# Patient Record
Sex: FEMALE | Race: WHITE
[De-identification: names, ages, dates, MRNs, and addresses within clinical notes are randomized per-mention and may not be internally consistent; named-entity substitution may affect disease eponyms.]

---

## 2018-07-03 ENCOUNTER — HOSPITAL ENCOUNTER (OUTPATIENT)
Dept: HOSPITAL 61 - NM | Age: 63
Discharge: HOME | End: 2018-07-03
Attending: INTERNAL MEDICINE
Payer: COMMERCIAL

## 2018-07-03 DIAGNOSIS — J44.9: ICD-10-CM

## 2018-07-03 DIAGNOSIS — E78.00: ICD-10-CM

## 2018-07-03 DIAGNOSIS — I10: ICD-10-CM

## 2018-07-03 DIAGNOSIS — I36.1: Primary | ICD-10-CM

## 2018-07-03 DIAGNOSIS — I27.20: ICD-10-CM

## 2018-07-03 DIAGNOSIS — E78.5: ICD-10-CM

## 2018-07-03 PROCEDURE — 96374 THER/PROPH/DIAG INJ IV PUSH: CPT

## 2018-07-03 PROCEDURE — 96376 TX/PRO/DX INJ SAME DRUG ADON: CPT

## 2018-07-03 PROCEDURE — 93017 CV STRESS TEST TRACING ONLY: CPT

## 2018-07-03 PROCEDURE — 96375 TX/PRO/DX INJ NEW DRUG ADDON: CPT

## 2018-07-03 PROCEDURE — 93306 TTE W/DOPPLER COMPLETE: CPT

## 2018-07-03 PROCEDURE — 78452 HT MUSCLE IMAGE SPECT MULT: CPT

## 2018-07-03 RX ADMIN — REGADENOSON 1 MG: 0.08 INJECTION, SOLUTION INTRAVENOUS at 08:15

## 2018-11-28 ENCOUNTER — HOSPITAL ENCOUNTER (OUTPATIENT)
Dept: HOSPITAL 61 - SURG | Age: 63
Discharge: HOME | End: 2018-11-28
Attending: ORTHOPAEDIC SURGERY
Payer: COMMERCIAL

## 2018-11-28 VITALS — SYSTOLIC BLOOD PRESSURE: 116 MMHG | DIASTOLIC BLOOD PRESSURE: 68 MMHG

## 2018-11-28 VITALS — WEIGHT: 158 LBS | BODY MASS INDEX: 25.39 KG/M2 | HEIGHT: 66 IN

## 2018-11-28 DIAGNOSIS — Z98.890: ICD-10-CM

## 2018-11-28 DIAGNOSIS — Z88.1: ICD-10-CM

## 2018-11-28 DIAGNOSIS — Z91.013: ICD-10-CM

## 2018-11-28 DIAGNOSIS — Z91.041: ICD-10-CM

## 2018-11-28 DIAGNOSIS — Z88.8: ICD-10-CM

## 2018-11-28 DIAGNOSIS — Z88.5: ICD-10-CM

## 2018-11-28 DIAGNOSIS — Z88.6: ICD-10-CM

## 2018-11-28 DIAGNOSIS — M65.311: ICD-10-CM

## 2018-11-28 DIAGNOSIS — G56.01: Primary | ICD-10-CM

## 2018-11-28 PROCEDURE — 26055 INCISE FINGER TENDON SHEATH: CPT

## 2018-11-28 PROCEDURE — 64721 CARPAL TUNNEL SURGERY: CPT

## 2018-11-28 PROCEDURE — 82962 GLUCOSE BLOOD TEST: CPT

## 2018-11-28 NOTE — DISCH
DISCHARGE INSTRUCTIONS


Condition on Discharge


Condition on Discharge:  Stable





Activity After Discharge


Activity Instructions for Disc:  Other, see below


Other activity instructions:  wiggle fingers


Bathing Instructions:  Shower-keep dressing dry


Lifting Instructions after Dis:  No heavy lifting


Weight Bearing Status after Di:  As tolerated





Diet after Discharge


Diet after Discharge:  Regular





Wound Incision Care


Wound/Incision Care:  Ice to area for comfort, Keep wound/cast CDI, Keep wound 

elevated, Change dressing


Other wound/incision instructi:  ok to change dressing in 2 days





Contacting the DR. after DC


Call your doctor for:  Concerns you may have





Follow-Up


Follow up with:  Kay in 2 wks











ANUSHKA BELL II, MD Nov 28, 2018 07:07

## 2018-11-28 NOTE — PDOC4
Operative Note


Operative Note


Date of procedure: 11/20/2018





Surgeon: Baljit Bell





Preoperative diagnosis: #1 right carpal tunnel syndrome


#2 right trigger finger, first digit





Postoperative diagnosis: Same





Procedures performed: #1 open right carpal tunnel release


Open right first digit trigger finger release





Anesthesia: Tiff block with sedation





Tourniquet time: 30 minutes





Blood loss: 5 mL





Complications: None





Findings: Normal-appearing median nerve, thickened nodule in the flexor tendon 

at thumb





Reason for procedure: Patient is very pleasant 63-year-old female who have been 

following for her carpal tunnel syndrome and trigger thumb of her right hand. 

She had tried conservative therapies including anti-inflammatories, nighttime 

splinting, and carpal tunnel injections. These failed to provide her adequate 

relief from her symptoms and therefore we discussed the risks, benefits, and 

alternatives to the above surgery and she wished to proceed.





Description of procedure: Patient was greeted in the preoperative area by 

myself for the correct extremity was verified and marked. She was brought back 

to the operative suite and box were started as she was brought back. Once in 

the operating room, she had successful induction of a [place] block with sedation. 

The right upper extremity was then prepped and draped in our usual sterile 

fashion we conducted our standard preoperative timeout. After this, I 

identified a crease in her hand from her distal wrist crease and incised 

through this into her palm. I used bipolar cautery for hemostasis. I used a 

mosquito to spread down to the level of the palmar fascia and placed a self-

retaining retractor. The palmar fascia was incised in line with the skin 

incision. I repositioned my retractor. Identified the transverse carpal 

ligament and release this sharply with a scalpel. I placed a Ragnell retractor 

distally and into my release with the tenotomy into the palm. I then performed 

the same maneuver to release the distal antebrachial fascia and an ulnar 

directed fashion into the wrist. I used the tip of the tenotomies palpate along 

the course of the nerve to ensure that accomplished a complete release and I 

was confident I had. I then directed my attention to her thumb and palpated for 

the nodule and made an incision, proximal 1 cm over the neck of the first 

metacarpal. Bipolar cautery was used for hemostasis. I used a mosquito clamp to 

spread above the tendon longitudinally. Identified the A1 pulley and release 

this with tenotomy scissors. I then delivered the tendon from the operative 

field with a mosquito to help and try to accomplish a complete release. After 

this, irrigated both incisions out. I then closed the incisions with simple 

interrupted 3-0 nylon. I then injected a local anesthetic mixture noted to the 

lisbeth-incisional skin at both areas. Xeroform followed by a soft bulky dressing 

was applied to her hand and thumb. No complications. All counts correct 2 

prior to wound closure. At the inclusion of surgery, she was awakened from her 

anesthetic and the tourniquet was let down. She was transferred gently supine 

to the recovery room cart. Postoperative plan is to discharge her home. Active 

range of motion at wrist and fingers was encouraged. Wound care was discussed 

and given written form. She will follow up with me in 2 weeks, sooner should a 

problem arise.











BALJIT BELL II, MD Nov 28, 2018 08:11

## 2020-03-11 ENCOUNTER — HOSPITAL ENCOUNTER (INPATIENT)
Dept: HOSPITAL 61 - ER | Age: 65
LOS: 2 days | Discharge: HOME | DRG: 554 | End: 2020-03-13
Attending: FAMILY MEDICINE | Admitting: FAMILY MEDICINE
Payer: COMMERCIAL

## 2020-03-11 VITALS — DIASTOLIC BLOOD PRESSURE: 58 MMHG | SYSTOLIC BLOOD PRESSURE: 109 MMHG

## 2020-03-11 VITALS — BODY MASS INDEX: 26.01 KG/M2 | WEIGHT: 161.82 LBS | HEIGHT: 66 IN

## 2020-03-11 DIAGNOSIS — M17.12: Primary | ICD-10-CM

## 2020-03-11 DIAGNOSIS — Z79.01: ICD-10-CM

## 2020-03-11 DIAGNOSIS — Z88.8: ICD-10-CM

## 2020-03-11 DIAGNOSIS — G89.29: ICD-10-CM

## 2020-03-11 DIAGNOSIS — F40.240: ICD-10-CM

## 2020-03-11 DIAGNOSIS — Z96.651: ICD-10-CM

## 2020-03-11 DIAGNOSIS — Z88.2: ICD-10-CM

## 2020-03-11 DIAGNOSIS — R07.89: ICD-10-CM

## 2020-03-11 DIAGNOSIS — F41.9: ICD-10-CM

## 2020-03-11 DIAGNOSIS — Z87.440: ICD-10-CM

## 2020-03-11 DIAGNOSIS — E78.5: ICD-10-CM

## 2020-03-11 DIAGNOSIS — I10: ICD-10-CM

## 2020-03-11 DIAGNOSIS — Z86.711: ICD-10-CM

## 2020-03-11 DIAGNOSIS — Z82.49: ICD-10-CM

## 2020-03-11 DIAGNOSIS — Z91.013: ICD-10-CM

## 2020-03-11 DIAGNOSIS — J45.909: ICD-10-CM

## 2020-03-11 DIAGNOSIS — Z90.49: ICD-10-CM

## 2020-03-11 DIAGNOSIS — E11.9: ICD-10-CM

## 2020-03-11 DIAGNOSIS — E78.00: ICD-10-CM

## 2020-03-11 DIAGNOSIS — Z79.899: ICD-10-CM

## 2020-03-11 DIAGNOSIS — Z86.718: ICD-10-CM

## 2020-03-11 LAB
ALBUMIN SERPL-MCNC: 4.2 G/DL (ref 3.4–5)
ALBUMIN/GLOB SERPL: 1.2 {RATIO} (ref 1–1.7)
ALP SERPL-CCNC: 82 U/L (ref 46–116)
ALT SERPL-CCNC: 23 U/L (ref 14–59)
ANION GAP SERPL CALC-SCNC: 9 MMOL/L (ref 6–14)
AST SERPL-CCNC: 17 U/L (ref 15–37)
BASOPHILS # BLD AUTO: 0.1 X10^3/UL (ref 0–0.2)
BASOPHILS NFR BLD: 1 % (ref 0–3)
BILIRUB SERPL-MCNC: 0.3 MG/DL (ref 0.2–1)
BUN SERPL-MCNC: 18 MG/DL (ref 7–20)
BUN/CREAT SERPL: 18 (ref 6–20)
CALCIUM SERPL-MCNC: 9.5 MG/DL (ref 8.5–10.1)
CHLORIDE SERPL-SCNC: 102 MMOL/L (ref 98–107)
CO2 SERPL-SCNC: 30 MMOL/L (ref 21–32)
CREAT SERPL-MCNC: 1 MG/DL (ref 0.6–1)
D DIMER PPP FEU-MCNC: 0.6 UG/MLFEU (ref 0–0.5)
EOSINOPHIL NFR BLD: 0.2 X10^3/UL (ref 0–0.7)
EOSINOPHIL NFR BLD: 3 % (ref 0–3)
ERYTHROCYTE [DISTWIDTH] IN BLOOD BY AUTOMATED COUNT: 12.9 % (ref 11.5–14.5)
GFR SERPLBLD BASED ON 1.73 SQ M-ARVRAT: 56 ML/MIN
GLOBULIN SER-MCNC: 3.4 G/DL (ref 2.2–3.8)
GLUCOSE SERPL-MCNC: 97 MG/DL (ref 70–99)
HCT VFR BLD CALC: 39.2 % (ref 36–47)
HGB BLD-MCNC: 13.3 G/DL (ref 12–15.5)
LIPASE: 225 U/L (ref 73–393)
LYMPHOCYTES # BLD: 2.2 X10^3/UL (ref 1–4.8)
LYMPHOCYTES NFR BLD AUTO: 37 % (ref 24–48)
MAGNESIUM SERPL-MCNC: 1.8 MG/DL (ref 1.8–2.4)
MCH RBC QN AUTO: 31 PG (ref 25–35)
MCHC RBC AUTO-ENTMCNC: 34 G/DL (ref 31–37)
MCV RBC AUTO: 91 FL (ref 79–100)
MONO #: 0.4 X10^3/UL (ref 0–1.1)
MONOCYTES NFR BLD: 7 % (ref 0–9)
NEUT #: 3.1 X10^3/UL (ref 1.8–7.7)
NEUTROPHILS NFR BLD AUTO: 52 % (ref 31–73)
PLATELET # BLD AUTO: 281 X10^3/UL (ref 140–400)
POTASSIUM SERPL-SCNC: 3.6 MMOL/L (ref 3.5–5.1)
PROT SERPL-MCNC: 7.6 G/DL (ref 6.4–8.2)
PROTHROMBIN TIME: 12.4 SEC (ref 11.7–14)
RBC # BLD AUTO: 4.3 X10^6/UL (ref 3.5–5.4)
SODIUM SERPL-SCNC: 141 MMOL/L (ref 136–145)
WBC # BLD AUTO: 5.9 X10^3/UL (ref 4–11)

## 2020-03-11 PROCEDURE — 83880 ASSAY OF NATRIURETIC PEPTIDE: CPT

## 2020-03-11 PROCEDURE — 80053 COMPREHEN METABOLIC PANEL: CPT

## 2020-03-11 PROCEDURE — A9540 TC99M MAA: HCPCS

## 2020-03-11 PROCEDURE — 80061 LIPID PANEL: CPT

## 2020-03-11 PROCEDURE — 93005 ELECTROCARDIOGRAM TRACING: CPT

## 2020-03-11 PROCEDURE — 83690 ASSAY OF LIPASE: CPT

## 2020-03-11 PROCEDURE — 71045 X-RAY EXAM CHEST 1 VIEW: CPT

## 2020-03-11 PROCEDURE — 94760 N-INVAS EAR/PLS OXIMETRY 1: CPT

## 2020-03-11 PROCEDURE — 94640 AIRWAY INHALATION TREATMENT: CPT

## 2020-03-11 PROCEDURE — 83735 ASSAY OF MAGNESIUM: CPT

## 2020-03-11 PROCEDURE — 85379 FIBRIN DEGRADATION QUANT: CPT

## 2020-03-11 PROCEDURE — 78580 LUNG PERFUSION IMAGING: CPT

## 2020-03-11 PROCEDURE — 85025 COMPLETE CBC W/AUTO DIFF WBC: CPT

## 2020-03-11 PROCEDURE — 82962 GLUCOSE BLOOD TEST: CPT

## 2020-03-11 PROCEDURE — 36415 COLL VENOUS BLD VENIPUNCTURE: CPT

## 2020-03-11 PROCEDURE — 85610 PROTHROMBIN TIME: CPT

## 2020-03-11 PROCEDURE — 84484 ASSAY OF TROPONIN QUANT: CPT

## 2020-03-11 PROCEDURE — 96374 THER/PROPH/DIAG INJ IV PUSH: CPT

## 2020-03-11 PROCEDURE — G0379 DIRECT REFER HOSPITAL OBSERV: HCPCS

## 2020-03-11 PROCEDURE — G0378 HOSPITAL OBSERVATION PER HR: HCPCS

## 2020-03-11 RX ADMIN — MONTELUKAST SODIUM SCH MG: 10 TABLET, FILM COATED ORAL at 23:44

## 2020-03-11 RX ADMIN — BACITRACIN SCH MLS/HR: 5000 INJECTION, POWDER, FOR SOLUTION INTRAMUSCULAR at 19:12

## 2020-03-11 RX ADMIN — ONDANSETRON PRN MG: 2 INJECTION INTRAMUSCULAR; INTRAVENOUS at 19:11

## 2020-03-11 RX ADMIN — ATORVASTATIN CALCIUM SCH MG: 20 TABLET, FILM COATED ORAL at 23:42

## 2020-03-11 RX ADMIN — PANTOPRAZOLE SODIUM SCH MG: 40 TABLET, DELAYED RELEASE ORAL at 23:42

## 2020-03-11 RX ADMIN — AMITRIPTYLINE HYDROCHLORIDE SCH MG: 10 TABLET, FILM COATED ORAL at 23:42

## 2020-03-11 RX ADMIN — CETIRIZINE HYDROCHLORIDE SCH MG: 10 TABLET, FILM COATED ORAL at 23:42

## 2020-03-11 RX ADMIN — GABAPENTIN SCH MG: 100 CAPSULE ORAL at 23:42

## 2020-03-11 RX ADMIN — ZOLPIDEM TARTRATE SCH MG: 5 TABLET ORAL at 23:42

## 2020-03-11 NOTE — PHYS DOC
Past Medical History


Past Medical History:  Asthma, Bronchitis, High Cholesterol, Hypertension, Other


Additional Past Medical Histor:  PE, DVT, bulging discs


Past Surgical History:  Knee Replacement, Tonsillectomy, Other


Additional Past Surgical Histo:  IVC filter


Smoking Status:  Never Smoker


Alcohol Use:  None


Drug Use:  None





Adult General


Chief Complaint


Chief Complaint:  CHEST PAIN





HPI


HPI





Patient is a 64  year old [female who presents with chest pressure.  Patient 

reports for the last 12 hours.  States that she started to feel a bit of 

pressure last night, today and the pressures continued.  States she has a 

history of prior DVT, prior pulmonary embolism, had an IVC filter placed, which 

had since dislodged and ended up in her lungs.  Reports she has chronic chest 

pain due to the IVC filter in her lungs, however the discomfort she feels today 

is different.  Patient denies any nausea.  Does states she has noticed some 

swelling in her feet, with some increased swelling mostly behind her knees.  

Denies cough.  Does states she also has had some increased exertional dyspnea 

over the last few days.  Reports her discomfort today is just a pressure in her 

chest





Review of Systems


Review of Systems





Constitutional: Denies fever or chills []


Eyes: Denies change in visual acuity, redness, or eye pain []


HENT: Denies nasal congestion or sore throat []


Respiratory: Denies cough does report she feels short of breath


Cardiovascular: No additional information not addressed in HPI []


GI: Denies abdominal pain, nausea, vomiting, bloody stools or diarrhea []


: Denies dysuria or hematuria []


Musculoskeletal: Denies back pain or joint pain reports she feels her lower 

extremities are more swollen []


Integument: Denies rash or skin lesions []


Neurologic: Denies headache, focal weakness or sensory changes []


Endocrine: Denies polyuria or polydipsia []





All other systems were reviewed and found to be within normal limits, except as 

documented in this note.





Current Medications


Current Medications





Current Medications








 Medications


  (Trade)  Dose


 Ordered  Sig/Hilda  Start Time


 Stop Time Status Last Admin


Dose Admin


 


 Enoxaparin Sodium


  (Lovenox 80mg


 Syringe)  70 mg  1X  ONCE  3/11/20 18:30


 3/11/20 18:38 DC 3/11/20 19:11


70 MG


 


 Ondansetron HCl


  (Zofran)  4 mg  PRN Q8HRS  PRN  3/11/20 18:30


 3/12/20 18:29  3/11/20 19:11


4 MG


 


 Sodium Chloride  1,000 ml @ 


 100 mls/hr  Q10H  3/11/20 18:30


 3/12/20 18:29  3/11/20 19:12


100 MLS/HR











Allergies


Allergies





Allergies








Coded Allergies Type Severity Reaction Last Updated Verified


 


  Iodinated Contrast Media Allergy Intermediate  (allergy to shellfish) 11/28/18

 Yes


 


  amoxicillin Allergy Intermediate  11/28/18 Yes


 


  azithromycin Allergy Intermediate  11/28/18 Yes


 


  duloxetine Allergy Intermediate  11/28/18 Yes


 


  formoterol Allergy Intermediate  11/28/18 Yes


 


  levofloxacin Allergy Intermediate  11/28/18 Yes


 


  losartan Allergy Intermediate  11/28/18 Yes


 


  mometasone furoate Allergy Intermediate  11/28/18 Yes


 


  nickel Allergy Intermediate  11/28/18 Yes


 


  pregabalin Allergy Intermediate  11/28/18 Yes


 


  shellfish derived Allergy Intermediate  11/28/18 Yes


 


  morphine Adverse Reaction Intermediate nausea and vomiting 11/28/18 Yes











Physical Exam


Physical Exam





Constitutional: Well developed, well nourished, no acute distress, non-toxic 

appearance. []


HENT: Normocephalic, atraumatic, oropharynx moist, no oral exudates, nose 

normal. []


Eyes: PERRLA, EOMI, conjunctiva normal, no discharge. [] 


Neck: Normal range of motion, no tenderness, supple, no stridor. [] 


Cardiovascular:Heart rate regular rhythm, no murmur []


Lungs & Thorax:  Bilateral breath sounds clear to auscultation speaks in 

multiple word sentences, however does appear to have mild dyspnea, no wheezing 

noted []


Abdomen: Bowel sounds normal, soft, no tenderness, no masses, no pulsatile 

masses. [] 


Skin: Warm, dry, no erythema, no rash. [] 


Back: No tenderness, no CVA tenderness. [] 


Extremities: No tenderness, no cyanosis, no clubbing, ROM intact, no edema.  No 

swelling noted.  No lesions noted to lower legs.  [] 


Neurologic: Alert and oriented X 3, normal motor function, normal sensory 

function, no focal deficits noted. []


Psychologic: Affect normal, judgement normal, mood normal. []





Current Patient Data


Vital Signs





                                   Vital Signs








  Date Time  Temp Pulse Resp B/P (MAP) Pulse Ox O2 Delivery O2 Flow Rate FiO2


 


3/11/20 18:00  70 21 135/71 (92) 97 Room Air  


 


3/11/20 16:12 98.4       





 98.4       








Lab Values





                                Laboratory Tests








Test


 3/11/20


16:05


 


White Blood Count


 5.9 x10^3/uL


(4.0-11.0)


 


Red Blood Count


 4.30 x10^6/uL


(3.50-5.40)


 


Hemoglobin


 13.3 g/dL


(12.0-15.5)


 


Hematocrit


 39.2 %


(36.0-47.0)


 


Mean Corpuscular Volume


 91 fL ()





 


Mean Corpuscular Hemoglobin 31 pg (25-35)  


 


Mean Corpuscular Hemoglobin


Concent 34 g/dL


(31-37)


 


Red Cell Distribution Width


 12.9 %


(11.5-14.5)


 


Platelet Count


 281 x10^3/uL


(140-400)


 


Neutrophils (%) (Auto) 52 % (31-73)  


 


Lymphocytes (%) (Auto) 37 % (24-48)  


 


Monocytes (%) (Auto) 7 % (0-9)  


 


Eosinophils (%) (Auto) 3 % (0-3)  


 


Basophils (%) (Auto) 1 % (0-3)  


 


Neutrophils # (Auto)


 3.1 x10^3/uL


(1.8-7.7)


 


Lymphocytes # (Auto)


 2.2 x10^3/uL


(1.0-4.8)


 


Monocytes # (Auto)


 0.4 x10^3/uL


(0.0-1.1)


 


Eosinophils # (Auto)


 0.2 x10^3/uL


(0.0-0.7)


 


Basophils # (Auto)


 0.1 x10^3/uL


(0.0-0.2)


 


Prothrombin Time


 12.4 SEC


(11.7-14.0)


 


Prothrombin Time INR 1.0 (0.8-1.1)  


 


D-Dimer (Kemi)


 0.60 ug/mlFEU


(0.00-0.50)  H


 


Sodium Level


 141 mmol/L


(136-145)


 


Potassium Level


 3.6 mmol/L


(3.5-5.1)


 


Chloride Level


 102 mmol/L


()


 


Carbon Dioxide Level


 30 mmol/L


(21-32)


 


Anion Gap 9 (6-14)  


 


Blood Urea Nitrogen


 18 mg/dL


(7-20)


 


Creatinine


 1.0 mg/dL


(0.6-1.0)


 


Estimated GFR


(Cockcroft-Gault) 56.0  





 


BUN/Creatinine Ratio 18 (6-20)  


 


Glucose Level


 97 mg/dL


(70-99)


 


Calcium Level


 9.5 mg/dL


(8.5-10.1)


 


Magnesium Level


 1.8 mg/dL


(1.8-2.4)


 


Total Bilirubin


 0.3 mg/dL


(0.2-1.0)


 


Aspartate Amino Transferase


(AST) 17 U/L (15-37)





 


Alanine Aminotransferase (ALT)


 23 U/L (14-59)





 


Alkaline Phosphatase


 82 U/L


()


 


Troponin I Quantitative


 < 0.017 ng/mL


(0.000-0.055)


 


NT-Pro-B-Type Natriuretic


Peptide 104 pg/mL


(0-124)


 


Total Protein


 7.6 g/dL


(6.4-8.2)


 


Albumin


 4.2 g/dL


(3.4-5.0)


 


Albumin/Globulin Ratio 1.2 (1.0-1.7)  


 


Lipase


 225 U/L


()





                                Laboratory Tests


3/11/20 16:05








                                Laboratory Tests


3/11/20 16:05











EKG


EKG


Normal sinus rhythm, no STEMI per Dr. Lovett at 1605 []





Radiology/Procedures


Radiology/Procedures


No radiographic evidence for acute cardiopulmonary process. 


Per Dr Erickson, Radiology[]





Course & Med Decision Making


Course & Med Decision Making


Pertinent Labs and Imaging studies reviewed. (See chart for details)





[Discussed findings with patient, with history of prior DVT and pulmonary 

embolism, with shortness of breath, chest discomfort, elevated d-dimer, and need

 to further evaluate for pulmonary embolism.  Discussed patient allergy, reports

 iodine makes her throat close up, rash, shortness of breath.  Patient does 

report she has had a VQ scan in the past.  Patient agreeable to admission for VQ

 scan if needed, will discuss with Dr. Vidales 





PERC - 2





] Discussed with Dr. Vidales, agrees to admission.  Plan to administer 

anticoagulant here.  With plan for VQ scan tomorrow.  Will monitor labs tonight.

 Patient agreement with plan with no further questions or concerns.





Dragon Disclaimer


Dragon Disclaimer


This electronic medical record was generated, in whole or in part, using a voice

recognition dictation system.





Departure


Departure


Impression:  


   Primary Impression:  


   Elevated d-dimer


   Additional Impression:  


   Shortness of breath


Disposition:  09 ADMITTED AS INPATIENT


Admitting Physician:  Sean Vidales


Condition:  STABLE


Referrals:  


SEAN VIDALES MD (PCP)





Problem Qualifiers











FATOUMATA NIXON              Mar 11, 2020 16:23

## 2020-03-11 NOTE — EKG
[facility]

              [address] [place] [address]

Test Date:    2020               Test Time:    16:02:44

Pat Name:     SERA QUICK              Department:   

Patient ID:   [id number]           Room:          

Gender:       F                        Technician:   

:          [id number]               Requested By: FATOUMATA NIXON

Order Number: [id number]           Reading MD:     

                                 Measurements

Intervals                              Axis          

Rate:         80                       P:            47

LA:           164                      QRS:          28

QRSD:         76                       T:            46

QT:           348                                    

QTc:          405                                    

                           Interpretive Statements

SINUS RHYTHM

NORMAL ECG

RI6.01

No previous ECG available for comparison

## 2020-03-11 NOTE — RAD
EXAM: AP View of the chest

 

DATE: 3/11/2020 5:31 PM

 

INDICATION: Shortness of air

 

COMPARISON: 10/27/2016

 

FINDINGS:

 

 

The heart is not enlarged.

 

Mediastinal and hilar contours are normal.

 

No focal parenchymal airspace opacity.

 

No pleural effusion or pneumothorax.

 

Shoulder joint degenerative changes are seen.

 

IMPRESSION:

1.  No radiographic evidence for acute cardiopulmonary process.

 

Electronically signed by: Arnaud Erickson MD (3/11/2020 6:09 PM) [id number]

## 2020-03-11 NOTE — NUR
The patient, SERA QUICK, 63 y/o, F admitted by SEAN ARIAS MD, was given written 
information regarding hospital policies, unit procedures and contact persons.  patient 
transferred to room via wheelchair by ED staff member.



Valuables were checked and noted. Patient denies any needs at this time. patient is laying 
in bed watching TV at this time. This RN will continue to monitor the patient at this time.

## 2020-03-12 VITALS — SYSTOLIC BLOOD PRESSURE: 124 MMHG | DIASTOLIC BLOOD PRESSURE: 69 MMHG

## 2020-03-12 VITALS — SYSTOLIC BLOOD PRESSURE: 99 MMHG | DIASTOLIC BLOOD PRESSURE: 66 MMHG

## 2020-03-12 VITALS — DIASTOLIC BLOOD PRESSURE: 50 MMHG | SYSTOLIC BLOOD PRESSURE: 87 MMHG

## 2020-03-12 VITALS — SYSTOLIC BLOOD PRESSURE: 98 MMHG | DIASTOLIC BLOOD PRESSURE: 62 MMHG

## 2020-03-12 VITALS — SYSTOLIC BLOOD PRESSURE: 105 MMHG | DIASTOLIC BLOOD PRESSURE: 61 MMHG

## 2020-03-12 VITALS — SYSTOLIC BLOOD PRESSURE: 107 MMHG | DIASTOLIC BLOOD PRESSURE: 66 MMHG

## 2020-03-12 PROCEDURE — 3E0U3BZ INTRODUCTION OF ANESTHETIC AGENT INTO JOINTS, PERCUTANEOUS APPROACH: ICD-10-PCS | Performed by: FAMILY MEDICINE

## 2020-03-12 PROCEDURE — 3E0U33Z INTRODUCTION OF ANTI-INFLAMMATORY INTO JOINTS, PERCUTANEOUS APPROACH: ICD-10-PCS | Performed by: FAMILY MEDICINE

## 2020-03-12 RX ADMIN — GABAPENTIN SCH MG: 100 CAPSULE ORAL at 08:54

## 2020-03-12 RX ADMIN — ATORVASTATIN CALCIUM SCH MG: 20 TABLET, FILM COATED ORAL at 21:09

## 2020-03-12 RX ADMIN — BACITRACIN SCH MLS/HR: 5000 INJECTION, POWDER, FOR SOLUTION INTRAMUSCULAR at 13:33

## 2020-03-12 RX ADMIN — BACITRACIN SCH MLS/HR: 5000 INJECTION, POWDER, FOR SOLUTION INTRAMUSCULAR at 03:34

## 2020-03-12 RX ADMIN — ONDANSETRON PRN MG: 2 INJECTION INTRAMUSCULAR; INTRAVENOUS at 03:37

## 2020-03-12 RX ADMIN — PANTOPRAZOLE SODIUM SCH MG: 40 TABLET, DELAYED RELEASE ORAL at 07:59

## 2020-03-12 RX ADMIN — ZOLPIDEM TARTRATE SCH MG: 5 TABLET ORAL at 21:09

## 2020-03-12 RX ADMIN — MONTELUKAST SODIUM SCH MG: 10 TABLET, FILM COATED ORAL at 17:10

## 2020-03-12 RX ADMIN — GABAPENTIN SCH MG: 100 CAPSULE ORAL at 13:33

## 2020-03-12 RX ADMIN — GABAPENTIN SCH MG: 100 CAPSULE ORAL at 21:09

## 2020-03-12 RX ADMIN — PANTOPRAZOLE SODIUM SCH MG: 40 TABLET, DELAYED RELEASE ORAL at 16:34

## 2020-03-12 RX ADMIN — ACETAMINOPHEN PRN MG: 325 TABLET, FILM COATED ORAL at 14:58

## 2020-03-12 RX ADMIN — CETIRIZINE HYDROCHLORIDE SCH MG: 10 TABLET, FILM COATED ORAL at 17:11

## 2020-03-12 RX ADMIN — AMITRIPTYLINE HYDROCHLORIDE SCH MG: 10 TABLET, FILM COATED ORAL at 21:09

## 2020-03-12 NOTE — NUR
SW following. Discussed with RN, pt from home with . Dr Villanueva has been consulted. RN 
advised no SW needs at this time, pt gets around fine. SW will continue to follow for any 
discharge needs.

## 2020-03-12 NOTE — RAD
NUCLEAR MEDICINE PERFUSION SCAN 

 

History: Chest pain, elevated d-dimer, shortness of breath x3 days. 

History of DVT and PE 9 years ago.

 

Comparison:  AP chest, prior day. 

 

Technique: Perfusion portion performed after intravenous administration of

5.3 mCi Technetium 99m MAA. Multiple projection planar images of the lungs

were obtained. 

 

Findings: 

Patient refused mask for ventilation images due to claustrophobia.

 

Perfusion images demonstrate no perfusion defects.  

 

IMPRESSION: 

Perfusion only imaging. No defects are identified to suggest pulmonary 

embolus.

 

Electronically signed by: Aidan Chin MD (3/12/2020 2:43 PM) [id number]

## 2020-03-12 NOTE — HP
ADMIT DATE:  03/11/2020



CHIEF COMPLAINT AND HISTORY OF PRESENT ILLNESS:  This 64-year-old white female

is followed in my office.  The patient presented on the day of admission with

chest pressure.  She was sent to the Emergency Room with a history of multiple

DVTs and PEs.  She has a prior IVC filter that was dislodged and winded up in

the lungs according to her.  The pressure that she is feeling on the day of

admission is different.  She has noted some bilateral lower extremity edema,

particularly in her knee area.  She has had increased and exertional dyspnea

over the last few days, had an elevated D-dimer, had IODINATED CONTRAST allergy

and was unable to do a V/Q on the day of admission, was admitted overnight for

V/Q scanning and further evaluation.



PAST MEDICAL HISTORY:  Remarkable for asthma, hyperlipidemia, hypertension, PE,

DVT.



PRIOR SURGICAL HISTORY:  Remarkable for right knee replacement, tonsillectomy,

IVC filter.



SOCIAL HISTORY:  She is a lifetime nonsmoker.  No alcohol, no drugs.



FAMILY HISTORY:  Positive for atherosclerotic heart disease.



MEDICATIONS:  Listed on the computer and have been addressed.



ALLERGIES:  SHE IS ALLERGIC TO IODINE, PENICILLIN, AZITHROMYCIN, FORMOTEROL,

DULOXETINE, LEVAQUIN, LOSARTAN, MOMETASONE, MORPHINE, NICKEL, PREGABALIN,

SHELLFISH DERIVATIVES.



REVIEW OF SYSTEMS:  As mentioned above.



PHYSICAL EXAMINATION:

GENERAL:  She is a well-developed, well-nourished white female, in no acute

distress in bed.

VITAL SIGNS:  Stable.  She is afebrile.

HEENT:  Unremarkable.

NECK:  Supple, without adenopathy or thyromegaly.

CHEST:  Clear to auscultation and percussion.

HEART:  Regular rate and rhythm without S3, S4 or murmur.

ABDOMEN:  Soft, nontender, without hepatosplenomegaly or masses.

EXTREMITIES:  Without cyanosis, clubbing or significant edema.

NEUROLOGIC:  She is intact.



Chest x-ray is within normal limits.  D-dimer is elevated.  CBC is unremarkable.

 Troponin x 3 is negative.  CMP is unremarkable.



IMPRESSION:

1.  Chest pressure/pain.

2.  Knee pain.

3.  Other problems listed above.



PLAN:  The patient has been admitted.  We will await V/Q scanning here today.  I

am going to ask Ortho to see her for the knee pain while she is here and the

patient will be monitored, managed and treated appropriately.

 



______________________________

SEAN ARIAS MD



DR:  Fléix  JOB#:  [id number] / [id number]

DD:  03/12/2020 08:43  DT:  03/12/2020 09:33

## 2020-03-13 VITALS — DIASTOLIC BLOOD PRESSURE: 61 MMHG | SYSTOLIC BLOOD PRESSURE: 95 MMHG

## 2020-03-13 VITALS — DIASTOLIC BLOOD PRESSURE: 63 MMHG | SYSTOLIC BLOOD PRESSURE: 111 MMHG

## 2020-03-13 VITALS — DIASTOLIC BLOOD PRESSURE: 81 MMHG | SYSTOLIC BLOOD PRESSURE: 137 MMHG

## 2020-03-13 VITALS
DIASTOLIC BLOOD PRESSURE: 79 MMHG | SYSTOLIC BLOOD PRESSURE: 133 MMHG | DIASTOLIC BLOOD PRESSURE: 79 MMHG | SYSTOLIC BLOOD PRESSURE: 133 MMHG

## 2020-03-13 VITALS — SYSTOLIC BLOOD PRESSURE: 107 MMHG | DIASTOLIC BLOOD PRESSURE: 69 MMHG

## 2020-03-13 LAB
CHOLEST SERPL-MCNC: 154 MG/DL (ref 0–200)
CHOLEST/HDLC SERPL: 3.4 {RATIO}
HDLC SERPL-MCNC: 45 MG/DL (ref 40–60)
LDLC: 83 MG/DL (ref 0–100)
TRIGL SERPL-MCNC: 130 MG/DL (ref 0–150)
VLDLC: 26 MG/DL (ref 0–40)

## 2020-03-13 RX ADMIN — ALBUTEROL SULFATE SCH MG: 108 AEROSOL, METERED RESPIRATORY (INHALATION) at 15:26

## 2020-03-13 RX ADMIN — PANTOPRAZOLE SODIUM SCH MG: 40 TABLET, DELAYED RELEASE ORAL at 08:38

## 2020-03-13 RX ADMIN — PANTOPRAZOLE SODIUM SCH MG: 40 TABLET, DELAYED RELEASE ORAL at 17:16

## 2020-03-13 RX ADMIN — ALBUTEROL SULFATE SCH MG: 108 AEROSOL, METERED RESPIRATORY (INHALATION) at 07:32

## 2020-03-13 RX ADMIN — ALBUTEROL SULFATE SCH MG: 108 AEROSOL, METERED RESPIRATORY (INHALATION) at 11:18

## 2020-03-13 RX ADMIN — ACETAMINOPHEN PRN MG: 325 TABLET, FILM COATED ORAL at 08:38

## 2020-03-13 RX ADMIN — GABAPENTIN SCH MG: 100 CAPSULE ORAL at 13:45

## 2020-03-13 RX ADMIN — GABAPENTIN SCH MG: 100 CAPSULE ORAL at 08:38

## 2020-03-13 RX ADMIN — ACETAMINOPHEN PRN MG: 325 TABLET, FILM COATED ORAL at 13:39

## 2020-03-13 NOTE — DS
DATE OF DISCHARGE:  03/13/2020



PRIMARY DIAGNOSIS:  Chest pain with shortness of breath.



ADDITIONAL DIAGNOSES:

1.  History of multiple pulmonary embolisms.

2.  Elevated D-dimer.

3.  Diabetes.

4.  Left knee pain.



CHIEF COMPLAINT AND HISTORY OF PRESENT ILLNESS:  This 64-year-old white female

admitted through the Emergency Room with shortness of breath, elevated D-dimer

and inability to do a CTA of the chest due to IODINE ALLERGY.



SUMMARY OF STAY:  The patient was admitted, had 2 poor V/Q scanning the

following day.  She was unable to complete this task due to claustrophobia, but

perfusion images demonstrate no perfusion deficits.  Because of the chest

pressure and shortness of breath, cardiology was asked to see her and felt it

was atypical and did not recommend any further workup unless it persisted.  She

had serial negative troponins.  A chest x-ray that showed no evidence of any

problems.  Had a CBC that was unremarkable.  Chem panel done on admission that

was essentially unremarkable.  Again, troponins x 4 were done.  Lipid panel was

within normal limits with an LDL of 83.  Sugars were good during the stay and

always within normal limits.  She complained of left knee pain involving the

shin bone for which I asked Ortho to come by and inject it with steroids,

probably osteoarthritis while she was in the hospital.  With Cardiology feeling

no further workup was needed, the patient is discharged on 03/13/2020.



DISPOSITION:  The patient is discharged to home, ADA diet.



ACTIVITY:  As tolerated.



FOLLOWUP:  Office in 1 week.



DISCHARGE MEDICATIONS:  Listed on the med rec and have been addressed.

 



______________________________

SEAN ARIAS MD



DR:  LUCIAN/kim  JOB#:  [id number] / [id number]

DD:  03/13/2020 17:24  DT:  03/13/2020 19:21

## 2020-03-13 NOTE — PDOC2
CHRISTOPHER ESPINOZA APRN 3/13/20 1146:


CARDIAC CONSULT


DATE OF CONSULT


Date of Consult


DATE: 3/13/20 


TIME: 11:40





REASON FOR CONSULT


Reason for Consult:


Chest pain





REFERRING PHYSICIAN


Referring Physician:


Appl





SOURCE


Source:  Chart review, Patient





HISTORY OF PRESENT ILLNESS


HISTORY OF PRESENT ILLNESS


This is a pleasant 65 yo female admitted for complains of chest pain. Reports 

that she has intermittent chest heaviness nonradiating. No associated BLISS.  Pt 

is able to load the laundry and carry it up and down the basement without 

difficulty.  She appears anxious and her chest pain is left of the midsternal 

border that is reproducible with palpation.  Denies any heartburn and with use 

of PPI.  Reports no NSAID therapy but takes tylenol for pain.  No recent 

intractable coughing, falls or any injury.  She is worried about pulmonary HTN 

and no noted edema and negative for orthopnea and PND.  Clinically no CHF.  

Reports no changes to activity tolerance.  She is also worried about a wire in 

her vena cava to which her IVC filter was removed before but a part of it "wire"

was left in place as this was not able to be retrieved. Her VSS and Hgb is 

stable. No abd pain nor significant back pain. Denies any palpitations or 

passing out.





PAST MEDICAL HISTORY


Cardiovascular:  HTN, Hyperlipidemia


Pulmonary:  Asthma, Pulmonary embolus (with DVT)


CENTRAL NERVOUS SYSTEM:  Carpal Tunnel Syndrome, Periperal neuropathy


Psych:  Anxiety


Musculoskeletal:   low back pain, Osteoarthritis


Renal/:  UTI


Endocrine:  Diabetes (2)





PAST SURGICAL HISTORY


Past Surgical History:  Total knee replacement (right), Other (IVC filter 

placement and removal)





SOCIAL HISTORY


Smoke:  No


ALCOHOL:  none


Lives:  with Family





CURRENT MEDICATIONS


CURRENT MEDICATIONS





Current Medications








 Medications


  (Trade)  Dose


 Ordered  Sig/Hilda


 Route


 PRN Reason  Start Time


 Stop Time Status Last Admin


Dose Admin


 


 Acetaminophen


  (Tylenol)  650 mg  PRN Q6HRS  PRN


 PO


 MILD PAIN / TEMP  3/12/20 14:30


    3/13/20 08:38





 


 Albuterol Sulfate


  (Ventolin Neb


 Soln)  2.5 mg  RTQID


 NEB


   3/13/20 08:00


    3/13/20 11:18





 


 Methylprednisolone


 Acetate


  (DEPO-Medrol


 80MG VIAL)  80 mg  1X  ONCE


 INJ


   3/13/20 07:45


 3/13/20 07:52 DC 3/13/20 08:50





 


 Bupivacaine HCl/


 Epinephrine Bitart


  (Sensorcain-Epi


 0.5%-1:175763 Mpf)  30 ml  1X  ONCE


 INJ


   3/13/20 07:45


 3/13/20 07:52 DC 3/13/20 08:50














ALLERGIES


ALLERGIES:  


Coded Allergies:  


     Iodinated Contrast Media (Verified  Allergy, Intermediate,  (allergy to 

shellfish), 11/28/18)


     amoxicillin (Verified  Allergy, Intermediate, 11/28/18)


     azithromycin (Verified  Allergy, Intermediate, 11/28/18)


     duloxetine (Verified  Allergy, Intermediate, 11/28/18)


     formoterol (Verified  Allergy, Intermediate, 11/28/18)


     levofloxacin (Verified  Allergy, Intermediate, 11/28/18)


     losartan (Verified  Allergy, Intermediate, 11/28/18)


     mometasone furoate (Verified  Allergy, Intermediate, 11/28/18)


     nickel (Verified  Allergy, Intermediate, 11/28/18)


     pregabalin (Verified  Allergy, Intermediate, 11/28/18)


     shellfish derived (Verified  Allergy, Intermediate, 11/28/18)


     morphine (Verified  Adverse Reaction, Intermediate, nausea and vomiting, 

11/28/18)





ROS


Review of System


14 point ROS evaluated with pertinent positives noted per HPI





PHYSICAL EXAM


General:  Alert, Oriented X3, Cooperative, No acute distress


HEENT:  Atraumatic, Mucous membr. moist/pink


Lungs:  Clear to auscultation, Normal air movement


Heart:  Regular rate (SR), Normal S1, Normal S2, Other (2/6 systolic murmur to 

LLS border)


Abdomen:  Soft, No tenderness


Extremities:  No cyanosis, No edema


Skin:  No breakdown, No significant lesion


Neuro:  Normal speech, Sensation intact


Psych/Mental Status:  Mood NL


MUSCULOSKELETAL:  Osteoarthritic changes both hands





VITALS/I&O


VITALS/I&O:





                                   Vital Signs








  Date Time  Temp Pulse Resp B/P (MAP) Pulse Ox O2 Delivery O2 Flow Rate FiO2


 


3/13/20 11:20      Room Air  


 


3/13/20 07:34     96   


 


3/13/20 07:00 97.8 79 16 137/81 (99)    





 97.8       














                                    I & O   


 


 3/12/20 3/12/20 3/13/20





 15:00 23:00 07:00


 


Intake Total 980 ml 120 ml 300 ml


 


Balance 980 ml 120 ml 300 ml











ECHOCARDIOGRAM


ECHOCARDIOGRAM





<Conclusion>


The left ventricular systolic function is normal.


The ejection fraction is estimated at 55-60%.


There is normal LV segmental wall motion.


Mild tricuspid regurgitation.


There is mild pulmonary hypertension.


The PA pressure was estimated at 35 mmHg.


There is no evidence of significant pericardial effusion.





DATE:     07/03/18 0916





STRESS TEST


STRESS TEST





Conclusion


1. No evidence of EKG changes with stress testing.


2. Normal perfusion at stress/rest.


3. Low risk study.


4. EF > 60%.





DATE:     07/05/18 1024





ASSESSMENT/PLAN


ASSESSMENT/PLAN


1. Atypical chest pain: trops nml. EKG SR no acute changes. suspect MSK and 

anxiety. Good functional capacity


2. HTN: controlled


3. HLP


4. DM2


5. Prior IVC filter removal with hx of PE: V/Q low probability. pt concern about

wire being left upon retrieval. Will defer to PCP


6. Anxiety





Recommendations


1. Continue secondary prevention measures  Continue home PPI


2. Could consider for outpt stress test if symptoms persist but at this time no 

further testing


3. Follow up in office RASHARD NAJERA MD 3/14/20 1348:


CARDIAC CONSULT


ASSESSMENT/PLAN


ASSESSMENT/PLAN


Late entry for 3/13/2020


Pt. seen and examined. Agree with above NP note.











CHRISTOPHER ESPINOZA          Mar 13, 2020 11:46


RASHARD GUTIERREZ MD       Mar 14, 2020 13:48

## 2020-03-13 NOTE — CONS
DATE OF CONSULTATION:  03/13/2020



REQUESTING PHYSICIAN:  Dr. Vidales.



REASON FOR CONSULTATION:  Left knee pain and swelling.



HISTORY OF PRESENT ILLNESS:  The patient is a 64-year-old female who presented

to the hospital for chest pressure and has a history of previous DVT and

pulmonary embolism.  She underwent a ventilation perfusion scan with low

probability.



In terms of her orthopedic issues, she has had left knee pain and swelling

increased with activity and resolves with rest, in fact a couple of days that

she has been in the hospital.  Her left knee swelling has gone down

significantly and she indicates, however, when she gets back to any type of

activity it always returns.  Other than anti-inflammatory use, she has had no

other real treatment or injections in the left knee.  She did have a right total

knee arthroplasty elsewhere several years prior, and I think has been seen by

Dr. Villanueva in the clinic, but has less than ideal results from her total knee

arthroplasty on the right.  She cannot get it all the way out straight.  She has

difficulty descending stairs and the bending is short of 90 degrees, making it

difficult for her to get leg under her and up out of the chair.



PAST MEDICAL HISTORY:  Significant for history of pulmonary embolism, DVT as

noted above, hypertension, hyperlipidemia and asthma.



PAST SURGICAL HISTORY:  Right total knee arthroplasty, placement of an IVC

filter and a tonsillectomy.



SOCIAL HISTORY:  Denies smoking, alcohol or drug use.  Otherwise, is very

active.



FAMILY HISTORY:  Significant for heart disease.



MEDICATIONS:  List is reviewed.



ALLERGIES:  SHE HAS MULTIPLE ALLERGIES INCLUDING PENICILLIN, IODINE,

AZITHROMYCIN, LEVAQUIN, LOSARTAN, MOMETASONE, DULOXETINE, MORPHINE, NICKEL,

PREGABALIN, SHELLFISH AND FORMOTEROL.



REVIEW OF SYSTEMS:  Significant really for the left knee pain and swelling and

the chronic right knee stiffness.  She denies any focal weakness, numbness,

tingling or other extremity or joint pain.



PHYSICAL EXAMINATION:

GENERAL:  A pleasant, cooperative 64-year-old female, alert and oriented, no

acute distress.

EXTREMITIES:  On examination of the lower extremities, range of motion of the

right knee is about 6 degrees short of full extension to about 85 degrees knee

flexion.  She has good ligament stability and patellofemoral tracking, no

effusion is noted.  Incision is well healed.  Examination of the left knee

reveals overall good alignment.  Ligaments are stable.  She has good

patellofemoral tracking with moderate crepitus.  No tilt or instability.  No

effusion is present.  No fullness in the popliteal fossa.  She has no evidence

of calf swelling or tenderness.  Normal alignment, stability, bilateral hips and

ankles and overall intact motor function, distal pulses, sensation, reflexes,

skin in both lower extremities throughout.



IMAGING:  Previous standing x-rays from 2016 show total knee arthroplasty on the

right and some mild degenerative changes on the left knee.



IMPRESSION:  Left knee pain, swelling, and degenerative changes.



TREATMENT PLAN:  She has really had now been very low probability for pulmonary

embolism.  No current swelling in the left knee, but she does indicate the

recurrence of the symptoms with any type of activity and after discussing

treatment options of the three general categories of injection for example

corticosteroid, viscosupplementation or biological, we discussed the relative

risks and benefits of each.  Viscosupplementation is not an option at all in the

hospital with insurance restrictions and she does wish to proceed with a

corticosteroid injection, which was administered after informed consent was

obtained on the left knee under sterile conditions from a suprapatellar

approach.  I would be happy to see her back on an as necessary basis.  I told

her that Dr. Vidales could also do these in his office.  As she has [employer], it

would be unlikely that they would cover viscosupplementation based on experience

and I did have a fairly detailed discussion with her that unfortunately the fact

that she had stiffness in the right knee is likely predictive of stiffness after

a knee arthroplasty on the other side and that would certainly be my last resort

as far as recommendations, especially since she is not very happy with the

overall result of the knee arthroplasty on the right, which was done elsewhere a

while ago.  She indicated understanding of this, appreciated the discussion and

followup can be on an as needed basis based on from an orthopedic standpoint.

 



______________________________

MATIAS SEYMOUR MD



DR:  ELDER/kim  JOB#:  [id number] / [id number]

DD:  03/13/2020 07:39  DT:  03/13/2020 10:29

## 2020-03-13 NOTE — NUR
SW following. Discussed with RN, pt transferring to [address] [place] [address]. Pt had left knee injected, 
doing better today per RN. RN anticipates no SW needs. AMARI will continue to follow. 

-------------------------------------------------------------------------------

Addendum: 03/13/20 at 1105 by TRE FITZPATRICK

-------------------------------------------------------------------------------

**CORRECTION** pt did not have left knee injected - wrong pt, please disregard.

## 2020-03-13 NOTE — NUR
Patient transferred to [address] [place] [address] 668 for cardiac consult and telemetry. Report called to 
MK Torres. Patient was transferred via wheelchair with all personal belongings.

## 2020-03-13 NOTE — PDOC
GENERAL


General:


vss and afebrile. awake and alert. still ill defined chest pressure and some so

b. VQ probably negative. with hx pulmonary hpt will ask cardiology for opinion 

prior to dc.





VITAL SIGNS/I&O


Vital Signs/I&O:





                                   Vital Signs








  Date Time  Temp Pulse Resp B/P (MAP) Pulse Ox O2 Delivery O2 Flow Rate FiO2


 


3/13/20 07:34     96 Room Air  


 


3/13/20 07:00 97.8 79 16 137/81 (99)    





 97.8       














                                    I & O   


 


 3/12/20 3/12/20 3/13/20





 15:00 23:00 07:00


 


Intake Total 980 ml 120 ml 300 ml


 


Balance 980 ml 120 ml 300 ml











ALLERGIES


Allergies:





Allergies








Coded Allergies Type Severity Reaction Last Updated Verified


 


  Iodinated Contrast Media Allergy Intermediate  (allergy to shellfish) 11/28/18

Yes


 


  amoxicillin Allergy Intermediate  11/28/18 Yes


 


  azithromycin Allergy Intermediate  11/28/18 Yes


 


  duloxetine Allergy Intermediate  11/28/18 Yes


 


  formoterol Allergy Intermediate  11/28/18 Yes


 


  levofloxacin Allergy Intermediate  11/28/18 Yes


 


  losartan Allergy Intermediate  11/28/18 Yes


 


  mometasone furoate Allergy Intermediate  11/28/18 Yes


 


  nickel Allergy Intermediate  11/28/18 Yes


 


  pregabalin Allergy Intermediate  11/28/18 Yes


 


  shellfish derived Allergy Intermediate  11/28/18 Yes


 


  morphine Adverse Reaction Intermediate nausea and vomiting 11/28/18 Yes











MEDS


Medications:





Current Medications








 Medications


  (Trade)  Dose


 Ordered  Sig/Hilda


 Route


 PRN Reason  Start Time


 Stop Time Status Last Admin


Dose Admin


 


 Acetaminophen


  (Tylenol)  650 mg  PRN Q6HRS  PRN


 PO


 MILD PAIN / TEMP  3/12/20 14:30


    3/12/20 14:58





 


 Albuterol Sulfate


  (Ventolin Neb


 Soln)  2.5 mg  RTQID


 NEB


   3/13/20 08:00


    3/13/20 07:32














LAB


Lab:





                                Laboratory Tests








Test


 3/12/20


11:40


 


Troponin I Quantitative


 < 0.017 ng/mL


(0.000-0.055)

















SEAN ARIAS MD              Mar 13, 2020 08:19

## 2021-02-05 ENCOUNTER — HOSPITAL ENCOUNTER (OUTPATIENT)
Dept: HOSPITAL 61 - LAB | Age: 66
End: 2021-02-05
Attending: OBSTETRICS & GYNECOLOGY
Payer: COMMERCIAL

## 2021-02-05 DIAGNOSIS — Z20.822: ICD-10-CM

## 2021-02-05 DIAGNOSIS — Z01.812: Primary | ICD-10-CM

## 2021-02-05 PROCEDURE — U0003 INFECTIOUS AGENT DETECTION BY NUCLEIC ACID (DNA OR RNA); SEVERE ACUTE RESPIRATORY SYNDROME CORONAVIRUS 2 (SARS-COV-2) (CORONAVIRUS DISEASE [COVID-19]), AMPLIFIED PROBE TECHNIQUE, MAKING USE OF HIGH THROUGHPUT TECHNOLOGIES AS DESCRIBED BY CMS-2020-01-R: HCPCS

## 2021-02-11 ENCOUNTER — HOSPITAL ENCOUNTER (OUTPATIENT)
Dept: HOSPITAL 61 - SURG | Age: 66
Discharge: HOME | End: 2021-02-11
Attending: OBSTETRICS & GYNECOLOGY
Payer: COMMERCIAL

## 2021-02-11 VITALS
SYSTOLIC BLOOD PRESSURE: 112 MMHG | SYSTOLIC BLOOD PRESSURE: 112 MMHG | SYSTOLIC BLOOD PRESSURE: 112 MMHG | DIASTOLIC BLOOD PRESSURE: 69 MMHG | DIASTOLIC BLOOD PRESSURE: 69 MMHG | DIASTOLIC BLOOD PRESSURE: 69 MMHG

## 2021-02-11 VITALS — BODY MASS INDEX: 25.07 KG/M2 | HEIGHT: 66 IN | WEIGHT: 156 LBS

## 2021-02-11 DIAGNOSIS — E11.9: ICD-10-CM

## 2021-02-11 DIAGNOSIS — N95.0: Primary | ICD-10-CM

## 2021-02-11 DIAGNOSIS — Z88.2: ICD-10-CM

## 2021-02-11 DIAGNOSIS — F41.9: ICD-10-CM

## 2021-02-11 DIAGNOSIS — Z88.1: ICD-10-CM

## 2021-02-11 DIAGNOSIS — I10: ICD-10-CM

## 2021-02-11 DIAGNOSIS — R93.89: ICD-10-CM

## 2021-02-11 DIAGNOSIS — E78.00: ICD-10-CM

## 2021-02-11 DIAGNOSIS — K21.9: ICD-10-CM

## 2021-02-11 DIAGNOSIS — M19.90: ICD-10-CM

## 2021-02-11 DIAGNOSIS — Z88.0: ICD-10-CM

## 2021-02-11 DIAGNOSIS — Z98.890: ICD-10-CM

## 2021-02-11 DIAGNOSIS — Z72.89: ICD-10-CM

## 2021-02-11 DIAGNOSIS — Z88.8: ICD-10-CM

## 2021-02-11 DIAGNOSIS — Z79.899: ICD-10-CM

## 2021-02-11 DIAGNOSIS — J44.9: ICD-10-CM

## 2021-02-11 DIAGNOSIS — F32.9: ICD-10-CM

## 2021-02-11 PROCEDURE — 82962 GLUCOSE BLOOD TEST: CPT

## 2021-02-11 PROCEDURE — 58558 HYSTEROSCOPY BIOPSY: CPT

## 2021-02-11 RX ADMIN — FENTANYL CITRATE PRN MCG: 50 INJECTION INTRAMUSCULAR; INTRAVENOUS at 10:00

## 2021-02-11 RX ADMIN — FENTANYL CITRATE PRN MCG: 50 INJECTION INTRAMUSCULAR; INTRAVENOUS at 10:28

## 2021-02-11 RX ADMIN — FENTANYL CITRATE PRN MCG: 50 INJECTION INTRAMUSCULAR; INTRAVENOUS at 10:11

## 2021-02-11 NOTE — OP
DATE OF SURGERY:  02/11/2021



PREOPERATIVE DIAGNOSES:  Postmenopausal bleeding with endometrial thickening on

sonogram.



POSTOPERATIVE DIAGNOSES:  Postmenopausal bleeding with endometrial thickening on

sonogram with some evidence of scarring up inside the uterus with thicker white

tissue near the fundus.



PROCEDURE:  Hysteroscopy, D and C with MyoSure fluid collection system.



SURGEON:  Gene Alvarado MD



ASSISTANT:  OR personnel



ANESTHESIOLOGIST:  Mark Mohan MD



ANESTHESIA:  General.



ESTIMATED BLOOD LOSS:  10 mL.



URINE OUTPUT:  I believe, she said it was 150, but it was straight cath prior to

procedure.



IV FLUIDS:  Please see anesthesia notes.



SPECIMENS:  Endometrial curettings.



COMPLICATIONS:  None.



FINDINGS:  Uterus that sounded to 8 cm with some thickening and white scarring

near the fundus.  No other abnormalities were seen inside the cavity.  I did use

the MyoSure Reach for, I believe was 3 minutes and 30 seconds cut time and then

a deficit around 550-600.



COMPLICATIONS:  No complications.



DESCRIPTION OF PROCEDURE:  This patient was taken to the operating room where

general anesthesia was placed.  The patient was placed in dorsal lithotomy

position in Bony stirrups.  The patient's vagina was prepped and draped in the

normal sterile fashion.  They did avoid Betadine and used, I think chlorhexidine

on the outside and vinegar on the inside, as she has allergies.  A straight cath

urine was done prior to my arrival.  Upon my arrival, a timeout was performed. 

Once everyone agreed on the patient, the site, the procedure, the antibiotics,

the procedure was initiated.  A weighted speculum was placed in the patient's

vagina.  A single-tooth tenaculum was used to grasp the anterior lip of the

cervix.  She did have some dark brown thicker mucus at the opening.  She was

easily dilated, the small Hegar, went it immediately.  She was dilated up to a 7

sounded to 8 cm.  The MyoSure scope had been primed and white balanced and was

placed in with the above findings.  The device was placed through the operating

channel to remove any tissue and scarring that we saw to open up the cavity. 

Once this was done, the procedure was ended.  It was removed.  The tenaculum was

removed.  There was some bleeding from the left tenaculum site.  Silver nitrate

was used on.  Once this was done, the procedure was ended.  All sponge, lap and

needle counts were correct x 2 by OR personnel.  The patient was awakened from

anesthesia and brought to recovery room in stable condition.

 



______________________________

GENE ALVARADO MD



DR:  ADRIANA/kim  JOB#:  [id number] / [id number]

DD:  02/11/2021 09:54  DT:  02/11/2021 10:16

## 2021-02-11 NOTE — PDOC
BRIEF OPERATIVE NOTE


Date:  Feb 11, 2021


Pre-Op Diagnosis


PMB. endometrial thickening on sonogram


Post-Op Diagnosis


same with some evidence of scarring inside uterus


Procedure Performed


hs D&C with myosure


Surgeon


Dr. Alvarado


Anesthesiologist


Dr. Mohan


Anesthesia Type:  General


Blood Loss


10cc


IV Fluid


see anesthesia records


Urine Output


straight cath prior


Specimens Obtained


endometrial currettings


Findings


uterus 8cm, some thickening and white scarring up near fundus


Complications


none


Operative Note


[id number]











GENE ALVARADO MD             Feb 11, 2021 09:54

## 2021-04-06 ENCOUNTER — HOSPITAL ENCOUNTER (OUTPATIENT)
Dept: HOSPITAL 61 - KCIC CT | Age: 66
End: 2021-04-06
Attending: FAMILY MEDICINE
Payer: COMMERCIAL

## 2021-04-06 DIAGNOSIS — D73.89: ICD-10-CM

## 2021-04-06 DIAGNOSIS — D25.9: ICD-10-CM

## 2021-04-06 DIAGNOSIS — K57.10: Primary | ICD-10-CM

## 2021-04-06 DIAGNOSIS — M41.86: ICD-10-CM

## 2021-04-06 PROCEDURE — 74176 CT ABD & PELVIS W/O CONTRAST: CPT

## 2021-04-06 NOTE — KCIC
PQRS Compliance Statement:



One or more of the following individualized dose reduction techniques were utilized for this examinat
ion:  

1. Automated exposure control  

2. Adjustment of the mA and/or kV according to patient size  

3. Use of iterative reconstruction technique





CT ABDOMEN+PELVIS W



Clinical Indication: Reason: LLQ pain 8 months, nausea. / Spl. Instructions:  / History: 



Comparison: CT abdomen and pelvis without contrast September 22, 2014.



TECHNIQUE: Helical CT imaging of the abdomen and pelvis is performed after oral contrast. IV contrast
 is not administered.



Findings: 

Mild atelectasis or scarring in the posterior right lower lobe. The cardiac size is normal.



Calcified granulomas in the spleen. The liver, gallbladder, pancreas, adrenal glands, abdominal aorta
, and kidneys are normal.



No obvious abnormality of the stomach. There is a thick-walled duodenal diverticulum measuring approx
imately 3.8 cm. There is no surrounding inflammation. There is no small bowel obstruction. The append
ix is not identified, no secondary signs of appendicitis. There is no colon wall thickening. No abdom
inal adenopathy or free fluid.



There are at least 3 partially calcified uterine fibroids, largest easures 2.8 cm. Urinary bladder is
 normal. There is no pelvic free fluid.



There is mild right convexity lumbar scoliosis. 



IMPRESSION:

1.  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 There is no surrounding inflammation. Cannot exclu
de that the wall thickening is due to inflammation/duodenitis.

2.  There is otherwise no acute abdominal or pelvic abnormality.

3.  Partially calcified uterine fibroids.



Electronically signed by: Aidan Chin MD (4/6/2021 1:08 PM) [id number]

## 2022-04-01 ENCOUNTER — HOSPITAL ENCOUNTER (OUTPATIENT)
Dept: HOSPITAL 61 - ECHO | Age: 67
End: 2022-04-01
Attending: INTERNAL MEDICINE
Payer: COMMERCIAL

## 2022-04-01 DIAGNOSIS — R01.1: Primary | ICD-10-CM

## 2022-04-01 DIAGNOSIS — R00.2: ICD-10-CM

## 2022-04-01 PROCEDURE — C8929 TTE W OR WO FOL WCON,DOPPLER: HCPCS

## 2022-04-01 PROCEDURE — 93306 TTE W/DOPPLER COMPLETE: CPT

## 2022-05-12 ENCOUNTER — HOSPITAL ENCOUNTER (INPATIENT)
Dept: HOSPITAL 61 - ER | Age: 67
LOS: 2 days | Discharge: HOME | DRG: 313 | End: 2022-05-14
Attending: FAMILY MEDICINE | Admitting: FAMILY MEDICINE
Payer: COMMERCIAL

## 2022-05-12 VITALS — HEIGHT: 66 IN | WEIGHT: 158.73 LBS | BODY MASS INDEX: 25.51 KG/M2

## 2022-05-12 DIAGNOSIS — G62.9: ICD-10-CM

## 2022-05-12 DIAGNOSIS — M19.90: ICD-10-CM

## 2022-05-12 DIAGNOSIS — I10: ICD-10-CM

## 2022-05-12 DIAGNOSIS — Z90.49: ICD-10-CM

## 2022-05-12 DIAGNOSIS — Z91.013: ICD-10-CM

## 2022-05-12 DIAGNOSIS — F17.200: ICD-10-CM

## 2022-05-12 DIAGNOSIS — E11.40: ICD-10-CM

## 2022-05-12 DIAGNOSIS — F41.9: ICD-10-CM

## 2022-05-12 DIAGNOSIS — J44.9: ICD-10-CM

## 2022-05-12 DIAGNOSIS — F32.A: ICD-10-CM

## 2022-05-12 DIAGNOSIS — G44.209: ICD-10-CM

## 2022-05-12 DIAGNOSIS — M79.7: ICD-10-CM

## 2022-05-12 DIAGNOSIS — K57.10: ICD-10-CM

## 2022-05-12 DIAGNOSIS — Z20.822: ICD-10-CM

## 2022-05-12 DIAGNOSIS — K21.9: ICD-10-CM

## 2022-05-12 DIAGNOSIS — D35.00: ICD-10-CM

## 2022-05-12 DIAGNOSIS — E78.00: ICD-10-CM

## 2022-05-12 DIAGNOSIS — N94.6: ICD-10-CM

## 2022-05-12 DIAGNOSIS — Z96.651: ICD-10-CM

## 2022-05-12 DIAGNOSIS — Z86.711: ICD-10-CM

## 2022-05-12 DIAGNOSIS — D25.9: ICD-10-CM

## 2022-05-12 DIAGNOSIS — E78.5: ICD-10-CM

## 2022-05-12 DIAGNOSIS — G89.29: ICD-10-CM

## 2022-05-12 DIAGNOSIS — R07.89: Primary | ICD-10-CM

## 2022-05-12 DIAGNOSIS — Z88.8: ICD-10-CM

## 2022-05-12 LAB
ALBUMIN SERPL-MCNC: 3.8 G/DL (ref 3.4–5)
ALBUMIN/GLOB SERPL: 1 {RATIO} (ref 1–1.7)
ALP SERPL-CCNC: 82 U/L (ref 46–116)
ALT SERPL-CCNC: 21 U/L (ref 14–59)
AMPHETAMINE/METHAMPHETAMINE: (no result)
ANION GAP SERPL CALC-SCNC: 12 MMOL/L (ref 6–14)
AST SERPL-CCNC: 14 U/L (ref 15–37)
BACTERIA #/AREA URNS HPF: 0 /HPF
BARBITURATES UR-MCNC: (no result) UG/ML
BASOPHILS # BLD AUTO: 0 X10^3/UL (ref 0–0.2)
BASOPHILS NFR BLD: 0 % (ref 0–3)
BENZODIAZ UR-MCNC: (no result) UG/L
BILIRUB SERPL-MCNC: 0.7 MG/DL (ref 0.2–1)
BUN SERPL-MCNC: 22 MG/DL (ref 7–20)
BUN/CREAT SERPL: 20 (ref 6–20)
CALCIUM SERPL-MCNC: 9.3 MG/DL (ref 8.5–10.1)
CANNABINOIDS UR-MCNC: (no result) UG/L
CHLORIDE SERPL-SCNC: 97 MMOL/L (ref 98–107)
CO2 SERPL-SCNC: 25 MMOL/L (ref 21–32)
COCAINE UR-MCNC: (no result) NG/ML
CREAT SERPL-MCNC: 1.1 MG/DL (ref 0.6–1)
EOSINOPHIL NFR BLD: 0 % (ref 0–3)
EOSINOPHIL NFR BLD: 0 X10^3/UL (ref 0–0.7)
ERYTHROCYTE [DISTWIDTH] IN BLOOD BY AUTOMATED COUNT: 13.8 % (ref 11.5–14.5)
GFR SERPLBLD BASED ON 1.73 SQ M-ARVRAT: 49.8 ML/MIN
GLUCOSE SERPL-MCNC: 232 MG/DL (ref 70–99)
HCT VFR BLD CALC: 40.8 % (ref 36–47)
HGB BLD-MCNC: 13.5 G/DL (ref 12–15.5)
LIPASE: 172 U/L (ref 73–393)
LYMPHOCYTES # BLD: 2.3 X10^3/UL (ref 1–4.8)
LYMPHOCYTES NFR BLD AUTO: 16 % (ref 24–48)
MAGNESIUM SERPL-MCNC: 2.3 MG/DL (ref 1.8–2.4)
MCH RBC QN AUTO: 30 PG (ref 25–35)
MCHC RBC AUTO-ENTMCNC: 33 G/DL (ref 31–37)
MCV RBC AUTO: 90 FL (ref 79–100)
METHADONE SERPL-MCNC: (no result) NG/ML
MONO #: 0.8 X10^3/UL (ref 0–1.1)
MONOCYTES NFR BLD: 5 % (ref 0–9)
NEUT #: 11.8 X10^3/UL (ref 1.8–7.7)
NEUTROPHILS NFR BLD AUTO: 79 % (ref 31–73)
OPIATES UR-MCNC: (no result) NG/ML
PCP SERPL-MCNC: (no result) MG/DL
PLATELET # BLD AUTO: 297 X10^3/UL (ref 140–400)
POTASSIUM SERPL-SCNC: 3.9 MMOL/L (ref 3.5–5.1)
PROT SERPL-MCNC: 7.5 G/DL (ref 6.4–8.2)
RBC # BLD AUTO: 4.56 X10^6/UL (ref 3.5–5.4)
RBC #/AREA URNS HPF: 0 /HPF (ref 0–2)
SODIUM SERPL-SCNC: 134 MMOL/L (ref 136–145)
WBC # BLD AUTO: 15 X10^3/UL (ref 4–11)
WBC #/AREA URNS HPF: (no result) /HPF (ref 0–4)

## 2022-05-12 PROCEDURE — 94760 N-INVAS EAR/PLS OXIMETRY 1: CPT

## 2022-05-12 PROCEDURE — G0378 HOSPITAL OBSERVATION PER HR: HCPCS

## 2022-05-12 PROCEDURE — 80307 DRUG TEST PRSMV CHEM ANLYZR: CPT

## 2022-05-12 PROCEDURE — 84484 ASSAY OF TROPONIN QUANT: CPT

## 2022-05-12 PROCEDURE — 83690 ASSAY OF LIPASE: CPT

## 2022-05-12 PROCEDURE — 83735 ASSAY OF MAGNESIUM: CPT

## 2022-05-12 PROCEDURE — 93005 ELECTROCARDIOGRAM TRACING: CPT

## 2022-05-12 PROCEDURE — 71045 X-RAY EXAM CHEST 1 VIEW: CPT

## 2022-05-12 PROCEDURE — 96374 THER/PROPH/DIAG INJ IV PUSH: CPT

## 2022-05-12 PROCEDURE — U0003 INFECTIOUS AGENT DETECTION BY NUCLEIC ACID (DNA OR RNA); SEVERE ACUTE RESPIRATORY SYNDROME CORONAVIRUS 2 (SARS-COV-2) (CORONAVIRUS DISEASE [COVID-19]), AMPLIFIED PROBE TECHNIQUE, MAKING USE OF HIGH THROUGHPUT TECHNOLOGIES AS DESCRIBED BY CMS-2020-01-R: HCPCS

## 2022-05-12 PROCEDURE — 94640 AIRWAY INHALATION TREATMENT: CPT

## 2022-05-12 PROCEDURE — 81001 URINALYSIS AUTO W/SCOPE: CPT

## 2022-05-12 PROCEDURE — 36415 COLL VENOUS BLD VENIPUNCTURE: CPT

## 2022-05-12 PROCEDURE — 80053 COMPREHEN METABOLIC PANEL: CPT

## 2022-05-12 PROCEDURE — 85025 COMPLETE CBC W/AUTO DIFF WBC: CPT

## 2022-05-12 PROCEDURE — 83880 ASSAY OF NATRIURETIC PEPTIDE: CPT

## 2022-05-12 PROCEDURE — 70450 CT HEAD/BRAIN W/O DYE: CPT

## 2022-05-12 NOTE — RAD
EXAM: CT Head without IV contrast



CLINICAL HISTORY: Reason: headache / Spl. Instructions:  / History: 



COMPARISON: None.



TECHNIQUE:

Routine CT of the head without contrast.



PQRS compliance statement - One or more of the following individualized dose reduction techniques wer
e utilized for this study:

1.  Automated exposure control

2.  Adjustment of the mA and/or kV according to patient size

3.  Use of iterative reconstruction technique



FINDINGS:  

There is no evidence of hemorrhage, mass or extra-axial fluid collection.



Grey-white differentiation is maintained with no evidence of edema. 



There is no mass effect or shift of the intracranial structures.



The ventricles, basilar cisterns and cortical sulci are normal in size and configuration for the ines
ents stated age.



The cerebellum and brainstem are unremarkable.  



The calvarium demonstrates no evidence of fracture or focal lesion.



There is normal aeration of the visualized paranasal sinuses and mastoid air cells.



The visualized portions of the orbits are normal.



IMPRESSION:

No evidence for acute intracranial process.



Electronically signed by: Arnaud Erickson MD (5/12/2022 7:07 PM) [id number]

## 2022-05-12 NOTE — RAD
EXAM: Chest, single view.



HISTORY: Chest pain.



COMPARISON: 3/11/2020



FINDINGS: A frontal view of the chest is obtained. There is no infiltrate, pleural effusion or pneumo
thorax. The heart is normal in size.



IMPRESSION: No acute pulmonary finding.



Electronically signed by: Misty Robb MD (5/12/2022 5:22 PM) [id number]

## 2022-05-12 NOTE — PHYS DOC
Past Medical History


Past Medical History:  Asthma, Bronchitis, High Cholesterol, Hypertension, Other


Additional Past Medical Histor:  PE, neuropathy, chronic back pain, pre diabetes


Past Surgical History:  Knee Replacement, Tonsillectomy, Other


Additional Past Surgical Histo:  IVC filter, hysteroscopy, knee scope, dilation 

and curretage


Smoking Status:  Current Every Day Smoker


Alcohol Use:  None


Drug Use:  None





General Adult


EDM:


Chief Complaint:  CHEST PAIN





HPI:


HPI:





Patient is a 66  year old female who presents with last few weeks of not feeling

well with increased blood pressure readings, intermittent headache to the base 

of her skull into the middle of her forehead, midsternal chest pain that is 

intermittent and will radiate to the left chest and she states is a pressure.  

Currently denying any type of headache.  Chest pain she rates at a 5 out of 10. 

She states she is having more exertional shortness of breath when going up some 

stairs.  She did see her primary care physician about this on May 4 and they did

some blood work.  She then went to her OB/GYN on May 11 because she has been 

having dysmenorrhea and was on Provera cream.  When she told her physician about

her symptoms they took her off the Provera yesterday.  She does have a history 

of tonsillectomy, PE with IVC filter, asthma, COPD, knee replacement, smoking, 

high cholesterol, hypertension, diabetes, heart murmur of which she had a echo 

on  and neuropathy.  Denies syncope, dizziness, numbness tingling, focal 

weakness, abdominal pain, nausea, vomiting, diarrhea, fever, vision change.





Review of Systems:


Review of Systems:


Constitutional:   Denies fever or chills. []


Eyes:   Denies change in visual acuity. +blurred vision[]


HENT:   Denies nasal congestion or sore throat. [] 


Respiratory:   Denies cough or + exertional shortness of breath. [] 


Cardiovascular:   + chest pain or denies edema. [] 


GI:   Denies abdominal pain, nausea, vomiting, bloody stools or diarrhea. [] 


:  Denies dysuria. [] 


Musculoskeletal:   Denies back pain or joint pain. [] 


Integument:   Denies rash. [] 


Neurologic:   + headache, denies focal weakness or sensory changes. [] 


Endocrine:   Denies polyuria or polydipsia. [] 


Lymphatic:  Denies swollen glands. [] 


Psychiatric:  Denies depression or anxiety. []





Heart Score:


C/O Chest Pain:  Yes


HEART Score for Chest Pain:  








HEART Score for Chest Pain Response (Comments) Value


 


History Slighlty/Non-Suspicious 0


 


ECG Normal 0


 


Age >45 - < 65 1


 


Risk Factors >3 Risk Factors or Hx CAD 2


 


Troponin < Normal Limit 0


 


Total  3








Risk Factors:


Risk Factors:  DM, Current or recent (<one month) smoker, HTN, HLP, family 

history of CAD, obesity.


Risk Scores:


Score 0 - 3:  2.5% MACE over next 6 weeks - Discharge Home


Score 4 - 6:  20.3% MACE over next 6 weeks - Admit for Clinical Observation


Score 7 - 10:  72.7% MACE over next 6 weeks - Early Invasive Strategies





Current Medications:





Current Medications








 Medications


  (Trade)  Dose


 Ordered  Sig/Hilad  Start Time


 Stop Time Status Last Admin


Dose Admin


 


 Aspirin


  (Claude Aspirin)  325 mg  1X  ONCE  22 18:00


 22 18:01   














Allergies:


Allergies:





Allergies








Coded Allergies Type Severity Reaction Last Updated Verified


 


  Iodinated Contrast Media Allergy Intermediate  (allergy to shellfish) 22 

Yes


 


  amoxicillin Allergy Intermediate Hives 22 Yes


 


  azithromycin Allergy Intermediate Rash 22 Yes


 


  duloxetine Allergy Intermediate  22 Yes


 


  formoterol Allergy Intermediate  22 Yes


 


  levofloxacin Allergy Intermediate rash 22 Yes


 


  losartan Allergy Intermediate Nausea 22 Yes


 


  mometasone furoate Allergy Intermediate  22 Yes


 


  nickel Allergy Intermediate Rash 22 Yes


 


  pregabalin Allergy Intermediate  22 Yes


 


  shellfish derived Allergy Intermediate  22 Yes


 


  roflumilast Allergy Unknown Nausea and Vomiting 22 Yes


 


  morphine Adverse Reaction Intermediate nausea and vomiting 22 Yes











Physical Exam:


PE:





Constitutional: Well developed, well nourished, no acute distress, non-toxic 

appearance. []


HENT: Normocephalic, atraumatic, bilateral external ears normal, oropharynx 

moist, no oral exudates, nose normal. []


Eyes: PERRLA, EOMI, conjunctiva normal, no discharge. [] 


Neck: Normal range of motion, no tenderness, supple, no stridor. [] 


Cardiovascular:Heart rate regular rhythm, no murmur []


Lungs & Thorax:  Bilateral breath sounds clear to auscultation []


Abdomen: Bowel sounds normal, soft, no tenderness, no masses, no pulsatile 

masses. [] 


Skin: Warm, dry, no erythema, no rash. [] 


Back: No tenderness, no CVA tenderness. [] 


Extremities: No tenderness, no cyanosis, no clubbing, ROM intact, no edema. [] 


Neurologic: Alert and oriented X 3, normal motor function, normal sensory fu

nction, no focal deficits noted. []


Psychologic: Affect normal, judgement normal, mood normal. []





**Normal physical exam





Current Patient Data:


Labs:





                                Laboratory Tests








Test


 22


16:50


 


White Blood Count


 15.0 x10^3/uL


(4.0-11.0)  H


 


Red Blood Count


 4.56 x10^6/uL


(3.50-5.40)


 


Hemoglobin


 13.5 g/dL


(12.0-15.5)


 


Hematocrit


 40.8 %


(36.0-47.0)


 


Mean Corpuscular Volume


 90 fL ()





 


Mean Corpuscular Hemoglobin 30 pg (25-35)  


 


Mean Corpuscular Hemoglobin


Concent 33 g/dL


(31-37)


 


Red Cell Distribution Width


 13.8 %


(11.5-14.5)


 


Platelet Count


 297 x10^3/uL


(140-400)


 


Neutrophils (%) (Auto) 79 % (31-73)  H


 


Lymphocytes (%) (Auto) 16 % (24-48)  L


 


Monocytes (%) (Auto) 5 % (0-9)  


 


Eosinophils (%) (Auto) 0 % (0-3)  


 


Basophils (%) (Auto) 0 % (0-3)  


 


Neutrophils # (Auto)


 11.8 x10^3/uL


(1.8-7.7)  H


 


Lymphocytes # (Auto)


 2.3 x10^3/uL


(1.0-4.8)


 


Monocytes # (Auto)


 0.8 x10^3/uL


(0.0-1.1)


 


Eosinophils # (Auto)


 0.0 x10^3/uL


(0.0-0.7)


 


Basophils # (Auto)


 0.0 x10^3/uL


(0.0-0.2)





                                Laboratory Tests


22 16:50








Vital Signs:





                                   Vital Signs








  Date Time  Temp Pulse Resp B/P (MAP) Pulse Ox O2 Delivery O2 Flow Rate FiO2


 


22 16:45 98.2 78 20 155/82 (106) 96 Room Air  





 98.2       











EKG:


EK and read by Dr. Spaulding as sinus rhythm and no STEMI





Radiology/Procedures:


Radiology/Procedures:


[]


Impression:


                            [address] [address]


                                 [phone]


                                        


                                 IMAGING REPORT





                                     Signed





PATIENT: SERA QUICK    ACCOUNT: [id number]     MRN#: [id number]


: 1955           LOCATION: ER              AGE: 66


SEX: F                    EXAM DT: 22         ACCESSION#: [id number]


STATUS: REG ER            ORD. PHYSICIAN: ELLI HOPKINS


REASON: CHEST PAIN


PROCEDURE: PORTABLE CHEST 1V





EXAM: Chest, single view.





HISTORY: Chest pain.





COMPARISON: 3/11/2020





FINDINGS: A frontal view of the chest is obtained. There is no infiltrate, 

pleural effusion or pneumothorax. The heart is normal in size.





IMPRESSION: No acute pulmonary finding.





Electronically signed by: Misty Ceballos MD (2022 5:22 PM) [id number]














DICTATED and SIGNED BY:     MISTY CEBALLOS MD


DATE:     22 172





                            [address] [address]


                                 [phone]


                                        


                                 IMAGING REPORT





                                     Signed





PATIENT: SERA QUICK    ACCOUNT: [id number]     MRN#: [id number]


: 1955           LOCATION: ER              AGE: 66


SEX: F                    EXAM DT: 22         ACCESSION#: [id number]


STATUS: REG ER            ORD. PHYSICIAN: ELLI HOPKINS


REASON: headache


PROCEDURE: CT HEAD WO CONTRAST





EXAM: CT Head without IV contrast





CLINICAL HISTORY: Reason: headache / Spl. Instructions:  / History: 





COMPARISON: None.





TECHNIQUE:


Routine CT of the head without contrast.





PQRS compliance statement - One or more of the following individualized dose 

reduction techniques were utilized for this study:


1.  Automated exposure control


2.  Adjustment of the mA and/or kV according to patient size


3.  Use of iterative reconstruction technique





FINDINGS:  


There is no evidence of hemorrhage, mass or extra-axial fluid collection.





Grey-white differentiation is maintained with no evidence of edema. 





There is no mass effect or shift of the intracranial structures.





The ventricles, basilar cisterns and cortical sulci are normal in size and 

configuration for the patients stated age.





The cerebellum and brainstem are unremarkable.  





The calvarium demonstrates no evidence of fracture or focal lesion.





There is normal aeration of the visualized paranasal sinuses and mastoid air 

cells.





The visualized portions of the orbits are normal.





IMPRESSION:


No evidence for acute intracranial process.





Electronically signed by: Arnaud Vázquez MD (2022 7:07 PM) [facility]














DICTATED and SIGNED BY:     ARNAUD VÁZQUEZ MD


DATE:     22





Course & Med Decision Making:


Course & Med Decision Making


Pertinent Labs and Imaging studies reviewed. (See chart for details)





See HPI.  Alert and oriented x4.  Ambulatory with a steady gait.  Speaks in full

 clear sentences.  No extremity edema.  Abdomen soft and nontender.  No 

nystagmus.  PERRLA.  Vital signs are within normal limits.  EKG shows a sinus 

rhythm without STEMI.  Chest pain is not reproducible with palpation pressure.  

Patient does not take any blood thinners.  I did give her a full aspirin here in

 the ED.





Chest x-ray shows no acute findings.  Blood work is generally unremarkable.  I 

spoke to Dr. Vidales for admission for chest pain and for a VQ scan in the morning 

due to the patient having chest pain, increased shortness of breath, recent 

hormone therapy, a history of PE and she is not currently on any kind of blood 

thinners.  She cannot have a scan of the chest due to having a contrast dye 

allergy and shellfish allergy.  Vital signs have remained within normal limits.


[]





Dragon Disclaimer:


Dragon Disclaimer:


This electronic medical record was generated, in whole or in part, using a voice

 recognition dictation system.





Departure


Departure


Impression:  


   Primary Impression:  


   Chest pain


   Qualified Codes:  R07.9 - Chest pain, unspecified


   Additional Impression:  


   Shortness of breath


Disposition:   ADMITTED AS INPATIENT


Admitting Physician:  Sean Vidales


Condition:  STABLE


Referrals:  


SEAN VIDALES MD (PCP)











ELLI HOPKINS            May 12, 2022 18:06

## 2022-05-13 VITALS — DIASTOLIC BLOOD PRESSURE: 68 MMHG | SYSTOLIC BLOOD PRESSURE: 119 MMHG

## 2022-05-13 VITALS — SYSTOLIC BLOOD PRESSURE: 119 MMHG | DIASTOLIC BLOOD PRESSURE: 71 MMHG

## 2022-05-13 VITALS — SYSTOLIC BLOOD PRESSURE: 142 MMHG | DIASTOLIC BLOOD PRESSURE: 73 MMHG

## 2022-05-13 VITALS — DIASTOLIC BLOOD PRESSURE: 66 MMHG | SYSTOLIC BLOOD PRESSURE: 101 MMHG

## 2022-05-13 VITALS — SYSTOLIC BLOOD PRESSURE: 117 MMHG | DIASTOLIC BLOOD PRESSURE: 72 MMHG

## 2022-05-13 VITALS — SYSTOLIC BLOOD PRESSURE: 128 MMHG | DIASTOLIC BLOOD PRESSURE: 69 MMHG

## 2022-05-13 VITALS — SYSTOLIC BLOOD PRESSURE: 118 MMHG | DIASTOLIC BLOOD PRESSURE: 73 MMHG

## 2022-05-13 RX ADMIN — GABAPENTIN SCH MG: 100 CAPSULE ORAL at 14:22

## 2022-05-13 RX ADMIN — ACETAMINOPHEN PRN MG: 325 TABLET, FILM COATED ORAL at 14:21

## 2022-05-13 RX ADMIN — METOCLOPRAMIDE HYDROCHLORIDE SCH MG: 10 TABLET ORAL at 11:44

## 2022-05-13 RX ADMIN — AZELASTINE HYDROCHLORIDE SCH SPRAY: 137 SPRAY, METERED NASAL at 11:42

## 2022-05-13 RX ADMIN — ALBUTEROL SULFATE SCH MG: 108 AEROSOL, METERED RESPIRATORY (INHALATION) at 16:00

## 2022-05-13 RX ADMIN — BUDESONIDE SCH MG: 0.5 INHALANT RESPIRATORY (INHALATION) at 21:13

## 2022-05-13 RX ADMIN — FLUTICASONE PROPIONATE SCH SPRAY: 50 SPRAY, METERED NASAL at 11:41

## 2022-05-13 RX ADMIN — METOPROLOL TARTRATE SCH MG: 50 TABLET, FILM COATED ORAL at 11:43

## 2022-05-13 RX ADMIN — GABAPENTIN SCH MG: 100 CAPSULE ORAL at 11:43

## 2022-05-13 RX ADMIN — PANTOPRAZOLE SODIUM SCH MG: 40 TABLET, DELAYED RELEASE ORAL at 11:43

## 2022-05-13 RX ADMIN — POTASSIUM CHLORIDE SCH MEQ: 1500 TABLET, EXTENDED RELEASE ORAL at 17:08

## 2022-05-13 RX ADMIN — PANTOPRAZOLE SODIUM SCH MG: 40 TABLET, DELAYED RELEASE ORAL at 17:09

## 2022-05-13 RX ADMIN — POTASSIUM CHLORIDE SCH MEQ: 1500 TABLET, EXTENDED RELEASE ORAL at 11:43

## 2022-05-13 RX ADMIN — ACETAMINOPHEN PRN MG: 325 TABLET, FILM COATED ORAL at 02:33

## 2022-05-13 RX ADMIN — ALBUTEROL SULFATE SCH MG: 108 AEROSOL, METERED RESPIRATORY (INHALATION) at 12:00

## 2022-05-13 RX ADMIN — AZELASTINE HYDROCHLORIDE SCH SPRAY: 137 SPRAY, METERED NASAL at 21:09

## 2022-05-13 RX ADMIN — METOCLOPRAMIDE HYDROCHLORIDE SCH MG: 10 TABLET ORAL at 17:09

## 2022-05-13 RX ADMIN — GABAPENTIN SCH MG: 100 CAPSULE ORAL at 21:14

## 2022-05-13 RX ADMIN — METOPROLOL TARTRATE SCH MG: 50 TABLET, FILM COATED ORAL at 21:14

## 2022-05-13 RX ADMIN — BUDESONIDE SCH MG: 0.5 INHALANT RESPIRATORY (INHALATION) at 12:00

## 2022-05-13 RX ADMIN — ALBUTEROL SULFATE SCH MG: 108 AEROSOL, METERED RESPIRATORY (INHALATION) at 21:13

## 2022-05-13 NOTE — NUR
SS following for discharge planning. SS reviewed pt chart and discussed with pt RN. Pt is 
currently on room air. Cardiology and GI following. SS will continue to follow for discharge 
planning.

## 2022-05-13 NOTE — HP
DATE OF SERVICE: 05/13/2022

ADMIT DATE: 05/12/2022

CHIEF COMPLAINT AND HISTORY OF PRESENT ILLNESS:  This 66-year-old female is well

known to me from followup in the office.  The patient has not been feeling well 

for the last few weeks.  She has been having symptomatology of chest pressure 

with radiation to the neck and the head.  She feels like she becomes flushed 

with this and may be somewhat tremulous.  She also admits to blood pressure 

elevation with the same.  It lasts up to 3-4 hours at a time.  She is seeing her

gynecologist day prior to admission who thought it might be related somehow to 

the addition of Provera to her regimen for bleeding fibroids, this was stopped. 

She did have a recent outpatient cardiac workup that was fairly unremarkable.  

She so far has had negative enzymes, cardiac enzymes from admission and had a 

head CT and chest x-ray showing no acute changes.  SHE HAS AN ALLERGY OF 

IODINATED CONTRAST and thus a V/Q scan is ordered for this morning, but 

symptomatology does not sound anything like a pulmonary embolus and I am going 

to cancel this.  However, we will have GI as well as Cardiology take a look and 

see if they are able to sort some of this out.



PAST MEDICAL HISTORY:  Remarkable for asthma, hyperlipidemia, hypertension, 

prior pulmonary embolism, chronic back pain, well controlled diabetes 

Neuropathy.



PAST SURGICAL HISTORY:  Remarkable for knee replacement, tonsillectomy, IVC 

filter, which is broken loose and is no longer functional.  Hysteroscopy, D and 

C.



MEDICATIONS:  Brought with the patient, listed on the computer have been 

addressed.



ALLERGIES:  SHE IS ALLERGIC TO IODINATED CONTRAST, AMOXICILLIN, ZITHROMAX, 

CYMBALTA, FORMOTEROL, LEVAQUIN, LOSARTAN, MOMETASONE, MORPHINE, NICKEL, LYRICA, 

ROFLUMAT MAST  AND SHELLFISH DERIVATIVES.



SOCIAL HISTORY:  She is an everyday smoker, does not abuse alcohol or drugs.  

She is retired.



FAMILY HISTORY:  Noncontributory.



REVIEW OF SYSTEMS:  As mentioned above.



PHYSICAL EXAMINATION:

GENERAL:  She is a well-developed, well-nourished female in no acute distress, 

lying in bed.

VITAL SIGNS:  Stable.  She is afebrile.

HEAD, EYES, EARS, NOSE AND THROAT:  Unremarkable.

NECK:  Supple, without adenopathy or thyromegaly.

CHEST:  Clear to auscultation and percussion.

HEART:  Regular rate and rhythm without S3, S4 or murmur.

ABDOMEN:  Soft, nontender, without hepatosplenomegaly or masses.

EXTREMITIES:  Without cyanosis, clubbing, edema.

NEUROLOGIC:  She is intact.



LABORATORY DATA:  Initial laboratory is remarkable for a white count of 15,000 

with a left shift.  CMP is remarkable for an elevated blood sugar.  Again, 

troponins are negative to date x 3.  Toxicology screen is negative.  Urinalysis 

is negative.



ASSESSMENT:  Chest pain, but atypical story, not really consistent with 

anything.  Pheochromocytoma raises my mind what the blood pressure and flushing 

associated with this.



PLAN:  GI and Cardiology evaluation will consider a 24-hour urine for 

pheochromocytoma and carcinoid of nothing else is apparent by their evaluation.







MADI/GERTRUDIS

DR: Félix   DD: 05/13/2022 07:54

DT: 05/13/2022 08:44   TID: [id number]

## 2022-05-13 NOTE — PDOC2
GI CONSULT


Date of Service:


DATE: 5/13/22 


TIME: 10:10





Reason For Consult:


chest pain





HPI:


HPI:


67 y/o female w/ central chest pressure (pretty constant) associated w/ ear 

fullness, fluctuating blood pressure, feeling like she might pass out, chest and

neck redness, and palpitations.  Gives h/o bronchitis treated w/ prednisone and 

atbx in April, not feeling great since then but worse x 10 days, also recently 

stopped Provera which she was taking w/ GYN for fibroids.





GI-wise has h/o GERD on Protonix BID + Reglan QHS.  Additional h/o nausea attri

buted to fibroids.  Sometimes feels bloated.  No hematochezia, melena, diarrhea,

constipation, or weight loss.  Appetite stable.  Sometimes "hard to swallow 

mucous."





No previous EGD or colonoscopy.  Does recall previous UGI that showed reflux and

hiatal hernia.


No GB, liver, pancreas, or PUD history.


Past imaging w/ thick-walled duodenal diverticulum.


D/o DM, says last A1c 6.8, couldn't tolerate metformin.





PMH:


PMH:


HTN, HLD, PE, DVT, asthma, CTS, anxiety, back pain, OA, UTI, DM, uterine 

fibroids


right knee surgery, ICV filter/dislodgement, hysteroscopy, D&C





FH:


Family History:  Cancer (liver and bone - father)





Social History:


Smoke:  No


ALCOHOL:  none





ROS:





GEN: Denies fevers, chills, sweats


HEENT: "ear fullness"


CV: +chest pressure +palpitations


RESP: Denies shortness of air, cough


GI: Per HPI


: Denies hematuria, dysuria


ENDO: Denies weight changes


NEURO: +dizziness


MSK: Denies weakness, joint pain/swelling


SKIN: "neck redness"





Vitals:


Vitals:





                                   Vital Signs








  Date Time  Temp Pulse Resp B/P (MAP) Pulse Ox O2 Delivery O2 Flow Rate FiO2


 


5/13/22 07:00 98.5 72 18 117/72 (87) 97 Room Air  





 98.5       











Labs:


Labs:





Laboratory Tests








Test


 5/12/22


16:50 5/12/22


19:31 5/12/22


19:55 5/12/22


21:56


 


White Blood Count


 15.0 x10^3/uL


(4.0-11.0) 


 


 





 


Red Blood Count


 4.56 x10^6/uL


(3.50-5.40) 


 


 





 


Hemoglobin


 13.5 g/dL


(12.0-15.5) 


 


 





 


Hematocrit


 40.8 %


(36.0-47.0) 


 


 





 


Mean Corpuscular Volume 90 fL ()    


 


Mean Corpuscular Hemoglobin 30 pg (25-35)    


 


Mean Corpuscular Hemoglobin


Concent 33 g/dL


(31-37) 


 


 





 


Red Cell Distribution Width


 13.8 %


(11.5-14.5) 


 


 





 


Platelet Count


 297 x10^3/uL


(140-400) 


 


 





 


Neutrophils (%) (Auto) 79 % (31-73)    


 


Lymphocytes (%) (Auto) 16 % (24-48)    


 


Monocytes (%) (Auto) 5 % (0-9)    


 


Eosinophils (%) (Auto) 0 % (0-3)    


 


Basophils (%) (Auto) 0 % (0-3)    


 


Neutrophils # (Auto)


 11.8 x10^3/uL


(1.8-7.7) 


 


 





 


Lymphocytes # (Auto)


 2.3 x10^3/uL


(1.0-4.8) 


 


 





 


Monocytes # (Auto)


 0.8 x10^3/uL


(0.0-1.1) 


 


 





 


Eosinophils # (Auto)


 0.0 x10^3/uL


(0.0-0.7) 


 


 





 


Basophils # (Auto)


 0.0 x10^3/uL


(0.0-0.2) 


 


 





 


Sodium Level


 134 mmol/L


(136-145) 


 


 





 


Potassium Level


 3.9 mmol/L


(3.5-5.1) 


 


 





 


Chloride Level


 97 mmol/L


() 


 


 





 


Carbon Dioxide Level


 25 mmol/L


(21-32) 


 


 





 


Anion Gap 12 (6-14)    


 


Blood Urea Nitrogen


 22 mg/dL


(7-20) 


 


 





 


Creatinine


 1.1 mg/dL


(0.6-1.0) 


 


 





 


Estimated GFR


(Cockcroft-Gault) 49.8 


 


 


 





 


BUN/Creatinine Ratio 20 (6-20)    


 


Glucose Level


 232 mg/dL


(70-99) 


 


 





 


Calcium Level


 9.3 mg/dL


(8.5-10.1) 


 


 





 


Magnesium Level


 2.3 mg/dL


(1.8-2.4) 


 


 





 


Total Bilirubin


 0.7 mg/dL


(0.2-1.0) 


 


 





 


Aspartate Amino Transf


(AST/SGOT) 14 U/L (15-37) 


 


 


 





 


Alanine Aminotransferase


(ALT/SGPT) 21 U/L (14-59) 


 


 


 





 


Alkaline Phosphatase


 82 U/L


() 


 


 





 


Troponin I High Sensitivity 4 ng/L (4-50)   5 ng/L (4-50)  


 


NT-Pro-B-Type Natriuretic


Peptide 163 pg/mL


(0-124) 


 


 





 


Total Protein


 7.5 g/dL


(6.4-8.2) 


 


 





 


Albumin


 3.8 g/dL


(3.4-5.0) 


 


 





 


Albumin/Globulin Ratio 1.0 (1.0-1.7)    


 


Lipase


 172 U/L


() 


 


 





 


Urine Collection Type  Unknown   


 


Urine Color (Auto)  Colorless   


 


Urine Turbidity  Clear   


 


Urine pH (Auto)  6.0 (<5.0-8.0)   


 


Urine Specific Gravity


 


 1.006


(1.000-1.030) 


 





 


Urine Protein (Auto)


 


 Negative mg/dL


(Negative) 


 





 


Urine Glucose (Auto)(UA)


 


 Negative mg/dL


(Negative) 


 





 


Urine Ketones (Auto)


 


 Negative mg/dL


(Negative) 


 





 


Urine Blood (Auto)


 


 Negative


(Negative) 


 





 


Urine Nitrite


 


 Negative


(Negative) 


 





 


Urine Bilirubin (Auto)


 


 Negative


(Negative) 


 





 


Urine Urobilinogen (Auto)


 


 Normal mg/dL


(Normal) 


 





 


Urine Leukocyte Esterase


(Auto) 


 Negative


(Negative) 


 





 


Urine RBC  0 /HPF (0-2)   


 


Urine WBC  Occ /HPF (0-4)   


 


Urine Squamous Epithelial


Cells 


 Few /LPF 


 


 





 


Urine Bacteria  0 /HPF (0-FEW)   


 


Urine Opiates Screen  Neg (NEG)   


 


Urine Methadone Screen  Neg (NEG)   


 


Urine Barbiturates  Neg (NEG)   


 


Urine Phencyclidine Screen  Neg (NEG)   


 


Urine


Amphetamine/Methamphetamine 


 Neg (NEG) 


 


 





 


Urine Benzodiazepines Screen  Neg (NEG)   


 


Urine Cocaine Screen  Neg (NEG)   


 


Urine Cannabinoids Screen  Neg (NEG)   


 


Urine Ethyl Alcohol  Neg (NEG)   


 


Coronavirus (COVID-19)(PCR)


 


 


 


 Not detected


(NOT DETECTD)


 


Test


 5/13/22


04:00 


 


 





 


Troponin I High Sensitivity 6 ng/L (4-50)    











Allergies:


Coded Allergies:  


     Iodinated Contrast Media (Verified  Allergy, Intermediate,  (allergy to 

shellfish), 5/12/22)


   short of breath, throat closes, hives


     amoxicillin (Verified  Allergy, Intermediate, Hives, 5/12/22)


     azithromycin (Verified  Allergy, Intermediate, Rash, 5/12/22)


     duloxetine (Verified  Allergy, Intermediate, 5/12/22)


   couldn't tolerate


     levofloxacin (Verified  Allergy, Intermediate, rash, 5/12/22)


     mometasone furoate (Verified  Allergy, Intermediate, 5/12/22)


     nickel (Verified  Allergy, Intermediate, Rash, 5/12/22)


     pregabalin (Verified  Allergy, Intermediate, 5/12/22)


     shellfish derived (Verified  Allergy, Intermediate, 5/12/22)


     formoterol (Verified  Adverse Reaction, Intermediate, 5/13/22)


   sore throat


     losartan (Verified  Adverse Reaction, Intermediate, Nausea, 5/13/22)


   and headache


     morphine (Verified  Adverse Reaction, Intermediate, nausea and vomiting, 

5/12/22)


     roflumilast (Verified  Adverse Reaction, Unknown, Nausea and Vomiting, 

5/13/22)





Medications:





Current Medications








 Medications


  (Trade)  Dose


 Ordered  Sig/Hilda


 Route


 PRN Reason  Start Time


 Stop Time Status Last Admin


Dose Admin


 


 Aspirin


  (Claude Aspirin)  325 mg  1X  ONCE


 PO


   5/12/22 18:00


 5/12/22 18:01 DC 5/12/22 18:00





 


 Albuterol Sulfate


  (Ventolin Neb


 Soln)  5 mg  1X  ONCE


 NEB


   5/12/22 19:45


 5/12/22 19:46 DC 5/12/22 19:58





 


 Methylprednisolone


 Sodium Succinate


  (SOLU-Medrol


 125MG VIAL)  80 mg  1X  ONCE


 IV


   5/12/22 19:45


 5/12/22 19:46 DC 5/12/22 19:56





 


 Acetaminophen


  (Tylenol)  650 mg  PRN Q4HRS  PRN


 PO


 FEVER > 100.3'F  5/12/22 20:45


 5/13/22 20:44  5/13/22 02:33














Imaging:


Imaging:


Head CT


IMPRESSION:


No evidence for acute intracranial process.





CXR


IMPRESSION: No acute pulmonary finding.





PE:





GEN: NAD


HEENT: Atraumatic, PERRL


LUNGS: CTAB


HEART: RRR


ABD: NABS, S/ND/NT


EXTREMITY: No edema


SKIN: No rashes, no jaundice


NEURO/PSYCH: A & O 3





A/P:


A/P:


Chest pressure, palpitations, weakness


Leukocytosis


H/o GERD and nausea


CRC screen - none


H/o DM and uterine fibroids


COVID negative





--


Not clear all these symptoms are GI-related, though does seem to have h/o GERD, 

also wonder about some delayed gastric emptying w/ DM history.


Continue PPI +/- Reglan.


Plan for EGD and colonoscopy as outpt.  If unrevealing, consider GES and/or GB 

imaging.











TEVIN MARINO         May 13, 2022 10:10

## 2022-05-13 NOTE — PDOC2
CHRISTOPHER ESPINOZA APRN 5/13/22 1224:


CARDIAC CONSULT


DATE OF CONSULT


Date of Consult


DATE: 5/13/22 


TIME: 12:03





REASON FOR CONSULT


Reason for Consult:


Chest pain





REFERRING PHYSICIAN


Referring Physician:


Ana





SOURCE


Source:  Chart review, Patient





HISTORY OF PRESENT ILLNESS


HISTORY OF PRESENT ILLNESS


This is a 65 yo female admitted for complains of headaches and chest pain. HA 

described as throbbing bilateral with associated fullness to both ears. No 

nausea vomiting, no vertigo. No fever or chills. Denies any nasal congestion or 

heartburn and does take allergy meds and PPI. No SOA but does have palpitations 

intermittent.Her chest pain is pressure midchest but no diaphoresis. No recent 

falls or injury. Her last stress test was in 2018. Reports that her BP in the 

afternoon goes up to as high as 150s/90s.





PAST MEDICAL HISTORY


Past Medical History


Cardiovascular:  HTN, Hyperlipidemia


Pulmonary:  Asthma, Pulmonary embolus (with DVT), COPD


CENTRAL NERVOUS SYSTEM:  Carpal Tunnel Syndrome, Peripheral neuropathy


GI: GERD


Psych:  Anxiety, depression


Musculoskeletal:   low back pain, Osteoarthritis, fibromyalgia


Renal/:  UTI


Endocrine:  Diabetes (2)





PAST SURGICAL HISTORY


Past Surgical History


 Total knee replacement (right), Other (IVC filter placement and removal), right

carpal tunnel release, lower back surgery





FAMILY HISTORY


Family History:  Heart Disease





SOCIAL HISTORY


Smoke:  No


ALCOHOL:  none


Drugs:  None


Lives:  with Family





CURRENT MEDICATIONS


CURRENT MEDICATIONS





Current Medications








 Medications


  (Trade)  Dose


 Ordered  Sig/Hilda


 Route


 PRN Reason  Start Time


 Stop Time Status Last Admin


Dose Admin


 


 Aspirin


  (Claude Aspirin)  325 mg  1X  ONCE


 PO


   5/12/22 18:00


 5/12/22 18:01 DC 5/12/22 18:00





 


 Albuterol Sulfate


  (Ventolin Neb


 Soln)  5 mg  1X  ONCE


 NEB


   5/12/22 19:45


 5/12/22 19:46 DC 5/12/22 19:58





 


 Methylprednisolone


 Sodium Succinate


  (SOLU-Medrol


 125MG VIAL)  80 mg  1X  ONCE


 IV


   5/12/22 19:45


 5/12/22 19:46 DC 5/12/22 19:56





 


 Acetaminophen


  (Tylenol)  650 mg  PRN Q4HRS  PRN


 PO


 FEVER > 100.3'F  5/12/22 20:45


 5/13/22 20:44  5/13/22 02:33





 


 Azelastine HCl


  (Astelin)  2 spray  BID


 NS


   5/13/22 10:00


    5/13/22 11:42





 


 Fluticasone


 Propionate


  (Flonase)  2 spray  DAILY


 NS


   5/13/22 10:00


    5/13/22 11:41





 


 Gabapentin


  (Neurontin)  100 mg  TID


 PO


   5/13/22 10:00


    5/13/22 11:43





 


 Hydrochlorothiazide


  (Hydrodiuril)  25 mg  DAILY


 PO


   5/13/22 10:00


    5/13/22 11:44





 


 Metoclopramide HCl


  (Reglan)  10 mg  TIDAC


 PO


   5/13/22 11:30


    5/13/22 11:44





 


 Potassium Chloride


  (Klor-Con)  20 meq  TIDWMEALS


 PO


   5/13/22 12:00


    5/13/22 11:43





 


 Guaifenesin


  (Mucinex)  1,200 mg  BID


 PO


   5/13/22 10:00


    5/13/22 11:43





 


 Metoprolol


 Tartrate


  (Lopressor)  50 mg  BID


 PO


   5/13/22 10:00


    5/13/22 11:43





 


 Pantoprazole


 Sodium


  (Protonix)  40 mg  BIDAC


 PO


   5/13/22 10:00


    5/13/22 11:43














ALLERGIES


ALLERGIES:  


Coded Allergies:  


     Iodinated Contrast Media (Verified  Allergy, Intermediate,  (allergy to 

shellfish), 5/12/22)


   short of breath, throat closes, hives


     amoxicillin (Verified  Allergy, Intermediate, Hives, 5/12/22)


     azithromycin (Verified  Allergy, Intermediate, Rash, 5/12/22)


     duloxetine (Verified  Allergy, Intermediate, 5/12/22)


   couldn't tolerate


     levofloxacin (Verified  Allergy, Intermediate, rash, 5/12/22)


     mometasone furoate (Verified  Allergy, Intermediate, 5/12/22)


     nickel (Verified  Allergy, Intermediate, Rash, 5/12/22)


     pregabalin (Verified  Allergy, Intermediate, 5/12/22)


     shellfish derived (Verified  Allergy, Intermediate, 5/12/22)


     formoterol (Verified  Adverse Reaction, Intermediate, 5/13/22)


   sore throat


     losartan (Verified  Adverse Reaction, Intermediate, Nausea, 5/13/22)


   and headache


     morphine (Verified  Adverse Reaction, Intermediate, nausea and vomiting, 

5/12/22)


     roflumilast (Verified  Adverse Reaction, Unknown, Nausea and Vomiting, 

5/13/22)





ROS


Review of System


14 point ROS evaluated with pertinent positives noted per HPI





PHYSICAL EXAM


General:  Alert, Oriented X3, Cooperative, No acute distress


HEENT:  Atraumatic, Mucous membr. moist/pink


Lungs:  Clear to auscultation, Normal air movement


Heart:  Regular rate (SR), Normal S1, Normal S2, No murmurs


Abdomen:  Soft, No tenderness


Extremities:  No cyanosis, No edema


Skin:  No breakdown, No significant lesion


Neuro:  Normal speech, Sensation intact


Psych/Mental Status:  Mental status NL, Mood NL


MUSCULOSKELETAL:  Osteoarthritic changes both hands





VITALS/I&O


VITALS/I&O:





                                   Vital Signs








  Date Time  Temp Pulse Resp B/P (MAP) Pulse Ox O2 Delivery O2 Flow Rate FiO2


 


5/13/22 11:43  74  118/73    


 


5/13/22 10:45 98.3  18  96 Room Air  





 98.3       














                                    I & O   


 


 5/12/22 5/12/22 5/13/22





 15:00 23:00 07:00


 


Intake Total   300 ml


 


Output Total   1100 ml


 


Balance   -800 ml











LABS


Lab:





                                Laboratory Tests








Test


 5/12/22


16:50 5/12/22


19:31 5/12/22


19:55 5/12/22


21:56


 


White Blood Count


 15.0 x10^3/uL


(4.0-11.0)  H 


 


 





 


Red Blood Count


 4.56 x10^6/uL


(3.50-5.40) 


 


 





 


Hemoglobin


 13.5 g/dL


(12.0-15.5) 


 


 





 


Hematocrit


 40.8 %


(36.0-47.0) 


 


 





 


Mean Corpuscular Volume


 90 fL ()


 


 


 





 


Mean Corpuscular Hemoglobin 30 pg (25-35)     


 


Mean Corpuscular Hemoglobin


Concent 33 g/dL


(31-37) 


 


 





 


Red Cell Distribution Width


 13.8 %


(11.5-14.5) 


 


 





 


Platelet Count


 297 x10^3/uL


(140-400) 


 


 





 


Neutrophils (%) (Auto) 79 % (31-73)  H   


 


Lymphocytes (%) (Auto) 16 % (24-48)  L   


 


Monocytes (%) (Auto) 5 % (0-9)     


 


Eosinophils (%) (Auto) 0 % (0-3)     


 


Basophils (%) (Auto) 0 % (0-3)     


 


Neutrophils # (Auto)


 11.8 x10^3/uL


(1.8-7.7)  H 


 


 





 


Lymphocytes # (Auto)


 2.3 x10^3/uL


(1.0-4.8) 


 


 





 


Monocytes # (Auto)


 0.8 x10^3/uL


(0.0-1.1) 


 


 





 


Eosinophils # (Auto)


 0.0 x10^3/uL


(0.0-0.7) 


 


 





 


Basophils # (Auto)


 0.0 x10^3/uL


(0.0-0.2) 


 


 





 


Sodium Level


 134 mmol/L


(136-145)  L 


 


 





 


Potassium Level


 3.9 mmol/L


(3.5-5.1) 


 


 





 


Chloride Level


 97 mmol/L


()  L 


 


 





 


Carbon Dioxide Level


 25 mmol/L


(21-32) 


 


 





 


Anion Gap 12 (6-14)     


 


Blood Urea Nitrogen


 22 mg/dL


(7-20)  H 


 


 





 


Creatinine


 1.1 mg/dL


(0.6-1.0)  H 


 


 





 


Estimated GFR


(Cockcroft-Gault) 49.8  


 


 


 





 


BUN/Creatinine Ratio 20 (6-20)     


 


Glucose Level


 232 mg/dL


(70-99)  H 


 


 





 


Calcium Level


 9.3 mg/dL


(8.5-10.1) 


 


 





 


Magnesium Level


 2.3 mg/dL


(1.8-2.4) 


 


 





 


Total Bilirubin


 0.7 mg/dL


(0.2-1.0) 


 


 





 


Aspartate Amino Transferase


(AST) 14 U/L (15-37)


L 


 


 





 


Alanine Aminotransferase (ALT)


 21 U/L (14-59)


 


 


 





 


Alkaline Phosphatase


 82 U/L


() 


 


 





 


Troponin I High Sensitivity 4 ng/L (4-50)    5 ng/L (4-50)   


 


NT-Pro-B-Type Natriuretic


Peptide 163 pg/mL


(0-124)  H 


 


 





 


Total Protein


 7.5 g/dL


(6.4-8.2) 


 


 





 


Albumin


 3.8 g/dL


(3.4-5.0) 


 


 





 


Albumin/Globulin Ratio 1.0 (1.0-1.7)     


 


Lipase


 172 U/L


() 


 


 





 


Urine Collection Type  Unknown    


 


Urine Color (Auto)  Colorless    


 


Urine Turbidity  Clear    


 


Urine pH (Auto)


 


 6.0 (<5.0-8.0)


 


 





 


Urine Specific Gravity


 


 1.006


(1.000-1.030) 


 





 


Urine Protein (Auto)


 


 Negative mg/dL


(Negative) 


 





 


Urine Glucose (Auto)(UA)


 


 Negative mg/dL


(Negative) 


 





 


Urine Ketones (Auto)


 


 Negative mg/dL


(Negative) 


 





 


Urine Blood (Auto)


 


 Negative


(Negative) 


 





 


Urine Nitrite


 


 Negative


(Negative) 


 





 


Urine Bilirubin (Auto)


 


 Negative


(Negative) 


 





 


Urine Urobilinogen (Auto)


 


 Normal mg/dL


(Normal) 


 





 


Urine Leukocyte Esterase


(Auto) 


 Negative


(Negative) 


 





 


Urine RBC  0 /HPF (0-2)    


 


Urine WBC


 


 Occ /HPF (0-4)


 


 





 


Urine Squamous Epithelial


Cells 


 Few /LPF  


 


 





 


Urine Bacteria


 


 0 /HPF (0-FEW)


 


 





 


Urine Opiates Screen  Neg (NEG)    


 


Urine Methadone Screen  Neg (NEG)    


 


Urine Barbiturates  Neg (NEG)    


 


Urine Phencyclidine Screen  Neg (NEG)    


 


Urine


Amphetamine/Methamphetamine 


 Neg (NEG)  


 


 





 


Urine Benzodiazepines Screen  Neg (NEG)    


 


Urine Cocaine Screen  Neg (NEG)    


 


Urine Cannabinoids Screen  Neg (NEG)    


 


Urine Ethyl Alcohol  Neg (NEG)    


 


SARS-CoV-2 (PCR)


 


 


 


 Not detected


(NOT DETECTD)


 


Test


 5/13/22


04:00 


 


 





 


Troponin I High Sensitivity 6 ng/L (4-50)     





                                Laboratory Tests


5/12/22 16:50








                                Laboratory Tests


5/12/22 16:50











ECHOCARDIOGRAM


ECHOCARDIOGRAM





<Conclusion>


The left ventricle is normal size.


The left ventricular systolic function is normal and the ejection fraction is 

within normal range.


LV ejection fraction is 50 to 55%.


There is normal LV segmental wall motion.


Doppler and Color Flow revealed no significant aortic regurgitation.


There is no significant aortic valvular stenosis.


Doppler and Color-flow revealed trace mitral regurgitation.


Doppler and Color Flow revealed trace tricuspid regurgitation.





DATE:     04/01/22 1703





STRESS TEST


STRESS TEST





Conclusion


1. No evidence of EKG changes with stress testing.


2. Normal perfusion at stress/rest.


3. Low risk study.


4. EF > 60%.





DATE:     07/05/18 1024





ASSESSMENT/PLAN


ASSESSMENT/PLAN


1. Chest pain: doubt ACS, trops nml. Recent TTE unremarkable


2. HTN: controlled


3. HLP


4. DM2


5. Hx of PE and IVC filter removal


6. Anxiety


7. Tension HA: per PCP


8. Hx of PSVT: maintaining SR





Recommendations


1. Continue secondary prevention measures  Continue home PPI


2. Will arrange for outpt MPI


3. HBPM bid for 2 weeks and to call our office if above parameter for further 

adjustment of her BP regimen


4. Continue metoprolol for SVT control





JOSE CRUZ HERNANDEZ MD 5/13/22 1708:


CARDIAC CONSULT


ASSESSMENT/PLAN


ASSESSMENT/PLAN


Patient seen and examined


She is feeling mildly better.


I agree with our nurse practitioners assessment and plan.


Chest pain: Resolved.  trops nml. Recent TTE unremarkable.  Outpatient MPI and 

follow-up.


HTN: controlled


HLP


DM2


Hx of PE and IVC filter removal


Tension HA: per PCP


Hx of PSVT: maintaining SR. continue metoprolol.




















CHRISTOPHER ESPINOZA          May 13, 2022 12:24


JOSE CRUZ HERNANDEZ MD            May 13, 2022 17:08

## 2022-05-14 VITALS — SYSTOLIC BLOOD PRESSURE: 105 MMHG | DIASTOLIC BLOOD PRESSURE: 55 MMHG

## 2022-05-14 VITALS — SYSTOLIC BLOOD PRESSURE: 119 MMHG | DIASTOLIC BLOOD PRESSURE: 77 MMHG

## 2022-05-14 VITALS — SYSTOLIC BLOOD PRESSURE: 125 MMHG | DIASTOLIC BLOOD PRESSURE: 78 MMHG

## 2022-05-14 RX ADMIN — BUDESONIDE SCH MG: 0.5 INHALANT RESPIRATORY (INHALATION) at 08:05

## 2022-05-14 RX ADMIN — GABAPENTIN SCH MG: 100 CAPSULE ORAL at 08:58

## 2022-05-14 RX ADMIN — METOCLOPRAMIDE HYDROCHLORIDE SCH MG: 10 TABLET ORAL at 11:55

## 2022-05-14 RX ADMIN — METOPROLOL TARTRATE SCH MG: 50 TABLET, FILM COATED ORAL at 08:58

## 2022-05-14 RX ADMIN — AZELASTINE HYDROCHLORIDE SCH SPRAY: 137 SPRAY, METERED NASAL at 08:57

## 2022-05-14 RX ADMIN — POTASSIUM CHLORIDE SCH MEQ: 1500 TABLET, EXTENDED RELEASE ORAL at 08:57

## 2022-05-14 RX ADMIN — METOCLOPRAMIDE HYDROCHLORIDE SCH MG: 10 TABLET ORAL at 08:57

## 2022-05-14 RX ADMIN — GABAPENTIN SCH MG: 100 CAPSULE ORAL at 14:12

## 2022-05-14 RX ADMIN — PANTOPRAZOLE SODIUM SCH MG: 40 TABLET, DELAYED RELEASE ORAL at 08:58

## 2022-05-14 RX ADMIN — FLUTICASONE PROPIONATE SCH SPRAY: 50 SPRAY, METERED NASAL at 08:56

## 2022-05-14 RX ADMIN — ALBUTEROL SULFATE SCH MG: 108 AEROSOL, METERED RESPIRATORY (INHALATION) at 08:05

## 2022-05-14 RX ADMIN — ALBUTEROL SULFATE SCH MG: 108 AEROSOL, METERED RESPIRATORY (INHALATION) at 11:40

## 2022-05-14 RX ADMIN — POTASSIUM CHLORIDE SCH MEQ: 1500 TABLET, EXTENDED RELEASE ORAL at 11:55

## 2022-05-14 NOTE — NUR
Discharge instructions reviewed with patient, patient voices understanding and denies 
questions or concerns at this time. IV removed from LAC with cannula intact, et telemetry 
monitor removed.  Patient escorted to main entrance of hospital via wheelchair by unit 
staff, et assisted into private vehicle driven by daughter. All belongings taken with 
patient at time of discharge.

## 2022-05-14 NOTE — DS
DATE OF DISCHARGE: 05/14/2022

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 

chest pain, fatigue and mild shortness of breath and her blood pressure was 

fairly high with some headaches. Her physical exam was unremarkable.  Chemistry 

profile, CBC,  urine drug screen and serology were all unremarkable as well as 

the regular urinalysis.  CT of the head was normal as was a chest x-ray.  

Metoprolol was added for palpitations and blood pressure elevation.  Her blood 

pressure has come down well with no further symptoms at this point.  She is 

comfortable to be followed as an outpatient at this point.



FINAL DIAGNOSIS:  Chest pain, etiology undetermined.



OPERATIONS, PROCEDURES, AND COMPLICATIONS:  None.



CONSULTATION: Dr. Renteria group.



DISPOSITION:  Home meds remain the same.  Added metoprolol at uncertain dose at 

this point.  Activity as tolerated.  Low salt intake and I will see her as an 

outpatient to arrange MPI or further testing as indicated based on the response.







CASI/NIT/MOH

DR: CASI/kim   DD: 05/14/2022 10:38

DT: 05/14/2022 11:09   TID: [id number]

## 2022-05-14 NOTE — PDOC
CARDIOLOGY PROGRESS NOTE


SUBJECTIVE:


No acute events overnight. 


patient still is wondering why she is having the headaches. 


Unclear if there are any hormonal issues. 


She denies any chest pain.





OBJECTIVE:


Vital Signs/I&O:





                                   Vital Signs








  Date Time  Temp Pulse Resp B/P (MAP) Pulse Ox O2 Delivery O2 Flow Rate FiO2


 


5/14/22 11:41      Room Air  


 


5/14/22 11:00 97.2 78 18 119/77 (91) 94   





 97.2       














                                    I & O 0  


 


 5/13/22 5/13/22 5/14/22





 15:00 23:00 07:00


 


Intake Total 360 ml 260 ml 


 


Output Total 220 ml 400 ml 150 ml


 


Balance 140 ml -140 ml -150 ml








Objective:


GEN.:    No apparent distress.  Alert and oriented.


HEENT:    Head is normocephalic, atraumatic


NECK:    Supple.  


LUNGS:    Clear to auscultation.


HEART:    RRR, S1, S2 present.  Peripheral pulses intact


ABDOMEN:    Soft, nontender.  Positive bowel sounds.


EXTREMITIES:    Without any cyanosis.    


NEUROLOGIC:     Normal speech, normal tone


PSYCHIATRIC:    Normal affect, normal mood.


SKIN:   No ulcerations





ASSESSMENT:


1. Non-cardiac chest pain


2. Vertiginous syndrome


3. Headaches. 


4. HTN





PLAN:


1. Patient's ekg, enzymes, CXR and exam are unremarkable. No further inpt 

testing necessary


2. I had a long discussion with patient 


Supportive care.





Justicifation of Admission Dx:


Justifications for Admission:


Justification of Admission Dx:  N/A











RASHARD GUTIERREZ MD       May 14, 2022 22:05

## 2022-05-14 NOTE — EKG
[facility]

              [address] [place] [address]

Test Date:    2022               Test Time:    16:50:44

Pat Name:     SERA QUICK              Department:   

Patient ID:   [id number]           Room:         2 

Gender:       F                        Technician:   

:          [id number]               Requested By: ELLI HOPKINS

Order Number: [id number]           Reading MD:   Yusuf Villalta MD

                                 Measurements

Intervals                              Axis          

Rate:         81                       P:            58

KY:           136                      QRS:          13

QRSD:         76                       T:            31

QT:           368                                    

QTc:          433                                    

                           Interpretive Statements

SINUS RHYTHM

Electronically Signed On 2022 21:25:59 CDT by Yusuf Villalta MD

## 2022-05-14 NOTE — PDOC
Provider Note


Date of Service:


DATE: 5/14/22 


TIME: 10:37


Provider Note


[id number]





Justifications for Admission


Other Justification














REID CASTRO MD             May 14, 2022 10:38

## 2024-11-27 NOTE — CARD
MR#: [id number]

Account#: [id number]

Accession#: [id number]

Date of Study: 04/01/2022

Ordering Physician: JOSE CRUZ LEMUS, 

Referring Physician: JOSE CRUZ LEMUS, 

Tech: [facility]





--------------- APPROVED REPORT --------------





EXAM: Two-dimensional and M-mode echocardiogram with Doppler and color Doppler.



Other Information 

Quality : FairHR: 74bpm

Rhythm : NSR



INDICATION

Palpitations 

Murmur 



2D DIMENSIONS 

Left Atrium(2D)3.5 (1.6-4.0cm)IVSd0.8 (0.7-1.1cm)

Aortic Root(2D)3.0 (2.0-3.7cm)LVDd4.2 (3.9-5.9cm)

LVOT Diameter1.9 (1.8-2.4cm)PWd0.8 (0.7-1.1cm)

LA Wphqfl28 (18-58mL)LVDs2.9 (2.5-4.0cm)

FS (%) 29.5 %SV44.0 ml



Aortic Valve

AoV Peak Vern.110.6cm/sAoV VTI23.6cm

AO Peak GR.4.9mmHgLVOT Peak Vern.100.0cm/s

LVOT  VTI 20.15cmAO Mean GR.3mmHg

ALEKSEY (VMAX)1.79lq6OWH   (VTI)2.36cm2



Mitral Valve

MV E Cxluvfka35.9cm/sMV DECEL [id number]

MV A Qtvlzuxy12.2cm/sMV [id number]

E/A  Ratio1.2MVA (PHT)3.30cm2



TDI

E/Lateral E'6.0E/Medial E'8.5



Pulmonary Valve

PV Peak Aklfrysy25.8cm/sPV Peak Grad.3mmHg



Tricuspid Valve

TR P. Dufwfotm998qr/sTR Peak Gr.18mmHg



Pulmonary Vein

S1 Cqrndikc55.5cm/sD2 Hubrguld21.7cm/s



 LEFT VENTRICLE 

The left ventricle is normal size. There is normal left ventricular wall thickness. The left ventricu
lar systolic function is normal and the ejection fraction is within normal range. LV ejection fractio
n is 50 to 55%. There is normal LV segmental wall motion. The left ventricular diastolic function and
 filling is normal for age. No left ventricle thrombus noted on this study. There is no ventricular s
eptal defect visualized. There is no left ventricular aneurysm. There is no mass noted in the left ve
ntricle.



 RIGHT VENTRICLE 

The right ventricle is normal size. There is normal right ventricular wall thickness. The right ventr
icular systolic function is normal.



 ATRIA 

The left atrium size is normal. The right atrium size is normal. The interatrial septum is intact wit
h no evidence for an atrial septal defect or patent foramen ovale as noted on 2-D or Doppler imaging.




 AORTIC VALVE 

The aortic valve is normal in structure and function. Doppler and Color Flow revealed no significant 
aortic regurgitation. There is no significant aortic valvular stenosis. There is no aortic valvular v
egetation.



 MITRAL VALVE 

The mitral valve is normal in structure and function. There is no evidence of mitral valve prolapse. 
There is no mitral valve stenosis. Doppler and Color-flow revealed trace mitral regurgitation.



 TRICUSPID VALVE 

The tricuspid valve is normal in structure and function. Doppler and Color Flow revealed trace tricus
pid regurgitation. There is no tricuspid valve prolapse or vegetation. There is no tricuspid valve st
enosis.



 PULMONIC VALVE 

The pulmonary valve is normal in structure and function. Doppler and Color Flow revealed no pulmonic 
valvular regurgitation. There is no pulmonic valvular stenosis.



 GREAT VESSELS 

The aortic root is normal in size. The ascending aorta is normal in size. The pulmonary artery is nor
mal. The IVC is normal in size and collapses >50% with inspiration.



 PERICARDIAL EFFUSION 

There is no evidence of significant pericardial effusion.



Critical Notification

Critical Value: No



<Conclusion>

The left ventricle is normal size.

The left ventricular systolic function is normal and the ejection fraction is within normal range.

LV ejection fraction is 50 to 55%.

There is normal LV segmental wall motion.

Doppler and Color Flow revealed no significant aortic regurgitation.

There is no significant aortic valvular stenosis.

Doppler and Color-flow revealed trace mitral regurgitation.

Doppler and Color Flow revealed trace tricuspid regurgitation.



Signed by : Jose Cruz Lemus MD

Electronically Approved : 04/01/2022 18:33:59
[email or web address]